# Patient Record
Sex: FEMALE | Race: BLACK OR AFRICAN AMERICAN | NOT HISPANIC OR LATINO | Employment: FULL TIME | URBAN - METROPOLITAN AREA
[De-identification: names, ages, dates, MRNs, and addresses within clinical notes are randomized per-mention and may not be internally consistent; named-entity substitution may affect disease eponyms.]

---

## 2017-11-02 ENCOUNTER — HOSPITAL ENCOUNTER (EMERGENCY)
Facility: CLINIC | Age: 42
Discharge: HOME | End: 2017-11-03
Attending: EMERGENCY MEDICINE | Admitting: EMERGENCY MEDICINE
Payer: COMMERCIAL

## 2017-11-02 ENCOUNTER — APPOINTMENT (OUTPATIENT)
Dept: GENERAL RADIOLOGY | Facility: CLINIC | Age: 42
End: 2017-11-02
Attending: EMERGENCY MEDICINE
Payer: COMMERCIAL

## 2017-11-02 DIAGNOSIS — R07.9 CHEST PAIN, UNSPECIFIED TYPE: ICD-10-CM

## 2017-11-02 DIAGNOSIS — M25.511 ACUTE PAIN OF RIGHT SHOULDER: ICD-10-CM

## 2017-11-02 DIAGNOSIS — K21.9 GASTROESOPHAGEAL REFLUX DISEASE, ESOPHAGITIS PRESENCE NOT SPECIFIED: ICD-10-CM

## 2017-11-02 LAB
ALBUMIN SERPL-MCNC: 3.1 G/DL (ref 3.4–5)
ALP SERPL-CCNC: 90 U/L (ref 40–150)
ALT SERPL W P-5'-P-CCNC: 25 U/L (ref 0–50)
ANION GAP SERPL CALCULATED.3IONS-SCNC: 5 MMOL/L (ref 3–14)
AST SERPL W P-5'-P-CCNC: 19 U/L (ref 0–45)
BASOPHILS # BLD AUTO: 0 10E9/L (ref 0–0.2)
BASOPHILS NFR BLD AUTO: 0.3 %
BILIRUB SERPL-MCNC: 0.2 MG/DL (ref 0.2–1.3)
BUN SERPL-MCNC: 13 MG/DL (ref 7–30)
CALCIUM SERPL-MCNC: 8.5 MG/DL (ref 8.5–10.1)
CHLORIDE SERPL-SCNC: 107 MMOL/L (ref 94–109)
CO2 SERPL-SCNC: 30 MMOL/L (ref 20–32)
CREAT SERPL-MCNC: 0.82 MG/DL (ref 0.52–1.04)
D DIMER PPP FEU-MCNC: <0.3 UG/ML FEU (ref 0–0.5)
DIFFERENTIAL METHOD BLD: ABNORMAL
EOSINOPHIL # BLD AUTO: 0.1 10E9/L (ref 0–0.7)
EOSINOPHIL NFR BLD AUTO: 0.5 %
ERYTHROCYTE [DISTWIDTH] IN BLOOD BY AUTOMATED COUNT: 13.9 % (ref 10–15)
GFR SERPL CREATININE-BSD FRML MDRD: 76 ML/MIN/1.7M2
GLUCOSE SERPL-MCNC: 101 MG/DL (ref 70–99)
HCT VFR BLD AUTO: 34.1 % (ref 35–47)
HGB BLD-MCNC: 11.1 G/DL (ref 11.7–15.7)
IMM GRANULOCYTES # BLD: 0 10E9/L (ref 0–0.4)
IMM GRANULOCYTES NFR BLD: 0.3 %
INTERPRETATION ECG - MUSE: NORMAL
LIPASE SERPL-CCNC: 79 U/L (ref 73–393)
LYMPHOCYTES # BLD AUTO: 2.8 10E9/L (ref 0.8–5.3)
LYMPHOCYTES NFR BLD AUTO: 29.3 %
MCH RBC QN AUTO: 26.3 PG (ref 26.5–33)
MCHC RBC AUTO-ENTMCNC: 32.6 G/DL (ref 31.5–36.5)
MCV RBC AUTO: 81 FL (ref 78–100)
MONOCYTES # BLD AUTO: 0.7 10E9/L (ref 0–1.3)
MONOCYTES NFR BLD AUTO: 7.8 %
NEUTROPHILS # BLD AUTO: 5.8 10E9/L (ref 1.6–8.3)
NEUTROPHILS NFR BLD AUTO: 61.8 %
NRBC # BLD AUTO: 0 10*3/UL
NRBC BLD AUTO-RTO: 0 /100
PLATELET # BLD AUTO: 299 10E9/L (ref 150–450)
POTASSIUM SERPL-SCNC: 3.8 MMOL/L (ref 3.4–5.3)
PROT SERPL-MCNC: 7 G/DL (ref 6.8–8.8)
RBC # BLD AUTO: 4.22 10E12/L (ref 3.8–5.2)
SODIUM SERPL-SCNC: 142 MMOL/L (ref 133–144)
TROPONIN I SERPL-MCNC: <0.015 UG/L (ref 0–0.04)
WBC # BLD AUTO: 9.4 10E9/L (ref 4–11)

## 2017-11-02 PROCEDURE — 96375 TX/PRO/DX INJ NEW DRUG ADDON: CPT

## 2017-11-02 PROCEDURE — 80053 COMPREHEN METABOLIC PANEL: CPT | Performed by: EMERGENCY MEDICINE

## 2017-11-02 PROCEDURE — 99285 EMERGENCY DEPT VISIT HI MDM: CPT | Mod: 25

## 2017-11-02 PROCEDURE — 96374 THER/PROPH/DIAG INJ IV PUSH: CPT

## 2017-11-02 PROCEDURE — 71020 XR CHEST 2 VW: CPT

## 2017-11-02 PROCEDURE — 25000132 ZZH RX MED GY IP 250 OP 250 PS 637: Performed by: EMERGENCY MEDICINE

## 2017-11-02 PROCEDURE — 25000125 ZZHC RX 250: Performed by: EMERGENCY MEDICINE

## 2017-11-02 PROCEDURE — 85379 FIBRIN DEGRADATION QUANT: CPT | Performed by: EMERGENCY MEDICINE

## 2017-11-02 PROCEDURE — 83690 ASSAY OF LIPASE: CPT | Performed by: EMERGENCY MEDICINE

## 2017-11-02 PROCEDURE — 93005 ELECTROCARDIOGRAM TRACING: CPT

## 2017-11-02 PROCEDURE — 85025 COMPLETE CBC W/AUTO DIFF WBC: CPT | Performed by: EMERGENCY MEDICINE

## 2017-11-02 PROCEDURE — 25000128 H RX IP 250 OP 636: Performed by: EMERGENCY MEDICINE

## 2017-11-02 PROCEDURE — 84484 ASSAY OF TROPONIN QUANT: CPT | Performed by: EMERGENCY MEDICINE

## 2017-11-02 PROCEDURE — S0028 INJECTION, FAMOTIDINE, 20 MG: HCPCS | Performed by: EMERGENCY MEDICINE

## 2017-11-02 RX ORDER — ONDANSETRON 2 MG/ML
4 INJECTION INTRAMUSCULAR; INTRAVENOUS ONCE
Status: COMPLETED | OUTPATIENT
Start: 2017-11-02 | End: 2017-11-02

## 2017-11-02 RX ORDER — MORPHINE SULFATE 2 MG/ML
2-4 INJECTION, SOLUTION INTRAMUSCULAR; INTRAVENOUS EVERY 10 MIN PRN
Status: DISCONTINUED | OUTPATIENT
Start: 2017-11-02 | End: 2017-11-03 | Stop reason: HOSPADM

## 2017-11-02 RX ORDER — ALUMINA, MAGNESIA, AND SIMETHICONE 2400; 2400; 240 MG/30ML; MG/30ML; MG/30ML
15 SUSPENSION ORAL ONCE
Status: COMPLETED | OUTPATIENT
Start: 2017-11-02 | End: 2017-11-02

## 2017-11-02 RX ADMIN — MORPHINE SULFATE 2 MG: 2 INJECTION, SOLUTION INTRAMUSCULAR; INTRAVENOUS at 22:27

## 2017-11-02 RX ADMIN — LIDOCAINE HYDROCHLORIDE 15 ML: 20 SOLUTION ORAL; TOPICAL at 22:25

## 2017-11-02 RX ADMIN — FAMOTIDINE 20 MG: 10 INJECTION, SOLUTION INTRAVENOUS at 22:24

## 2017-11-02 RX ADMIN — ALUMINUM HYDROXIDE, MAGNESIUM HYDROXIDE, AND DIMETHICONE 15 ML: 400; 400; 40 SUSPENSION ORAL at 22:25

## 2017-11-02 RX ADMIN — ONDANSETRON 4 MG: 2 SOLUTION INTRAMUSCULAR; INTRAVENOUS at 22:22

## 2017-11-02 NOTE — ED AVS SNAPSHOT
Emergency Department    [address] [place] [address]    Phone:  [phone]    Fax:  [phone]                                       Katarzyna Brito   MRN: [id number]    Department:   Emergency Department   Date of Visit:  11/2/2017           After Visit Summary Signature Page     I have received my discharge instructions, and my questions have been answered. I have discussed any challenges I see with this plan with the nurse or doctor.    ..........................................................................................................................................  Patient/Patient Representative Signature      ..........................................................................................................................................  Patient Representative Print Name and Relationship to Patient    ..................................................               ................................................  Date                                            Time    ..........................................................................................................................................  Reviewed by Signature/Title    ...................................................              ..............................................  Date                                                            Time

## 2017-11-02 NOTE — ED AVS SNAPSHOT
Emergency Department    [address] [facility] [address]    Phone:  [phone]    Fax:  [phone]                                       Katarzyna Brito   MRN: [id number]    Department:   Emergency Department   Date of Visit:  11/2/2017           Patient Information     Date Of Birth          1975        Your diagnoses for this visit were:     Chest pain, unspecified type     Gastroesophageal reflux disease, esophagitis presence not specified     Acute pain of right shoulder        You were seen by Mamadou Diana MD.      Follow-up Information     Follow up with Clinic, [facility].    Specialty:  Internal Medicine    Contact information:    [address] [facility] [address] [facility] [address] [phone]          Follow up with  Emergency Department.    Specialty:  EMERGENCY MEDICINE    Why:  If symptoms worsen    Contact information:    [address] [facility] [address] [phone]        Discharge Instructions           Discharge Instructions for Gastroesophageal Reflux Disease (GERD)  Gastroesophageal reflux disease (GERD) is a backflow of acid from the stomach into the swallowing tube (esophagus).  Home care  These home care steps can help you manage GERD:    Maintain a healthy weight. Get help to lose any extra pounds.    Avoid lying down after meals.    Avoid eating late at night.    Elevate the head of your bed by 6 inches. You can do this by placing wooden blocks or bed risers under the head of your bed.    Avoid wearing tight-fitting clothes.    Avoid foods that might irritate your stomach, such as the following:    Alcohol    Fat    Chocolate    Caffeine    Spearmint or peppermint    Talk to your healthcare provider if you are taking any of the following medicines. These medicines can make GERD symptoms worse:    Calcium channel blockers    Theophylline    Anticholinergic medicines, such as oxybutynin and benzatropine    Begin an exercise program.  Ask your healthcare provider how to get started. You can benefit from simple activities, such as walking or gardening.    Break the smoking habit. Enroll in a stop-smoking program to improve your chances of success.    Limit alcohol intake to no more than 2 drinks a day.    Take your medicines exactly as directed. Don t skip doses.    Avoid over-the-counter nonsteroidal anti-inflammatory medicines, such as aspirin and ibuprofen, unless recommended by your healthcare provider for certain conditions.     If possible, avoid nitrates (heart medicines, such as nitroglycerin and isosorbide dinitrate ).  Follow-up care  Make a follow-up appointment as directed by our staff.     When to call the healthcare provider  Call your healthcare provider immediately if you have any of the following:    Trouble swallowing    Pain when swallowing    Feeling of food caught in your chest or throat    Pain in the neck, chest, or back    Heartburn that causes you to vomit    Vomiting blood    Black or tarry stools (from digested blood)    More saliva (watering of the mouth) than usual    Weight loss of more than 3% to 5% of your total body weight in a month    Hoarseness or sore throat that won t go away    Choking, coughing, or wheezing   Date Last Reviewed: 7/1/2016 2000-2017 [employer]. [address] [place] [address]. All rights reserved. This information is not intended as a substitute for professional medical care. Always follow your healthcare professional's instructions.        *CHEST PAIN, NONCARDIAC    Based on your visit today, the exact cause of your chest pain is not certain. Your condition does not seem serious and your pain does not appear to be coming from your heart. However, sometimes the signs of a serious problem take more time to appear. Therefore, please watch for the warning signs listed below.  HOME CARE:  1. Rest today and avoid strenuous activity.  2. Take any prescribed medicine as  directed.  FOLLOW UP with your doctor in 1-3 days.   GET PROMPT MEDICAL ATTENTION if any of the following occur:    A change in the type of pain: if it feels different, becomes more severe, lasts longer, or begins to spread into your shoulder, arm, neck, jaw or back    Shortness of breath or increased pain with breathing    Cough with blood or dark colored sputum (phlegm)    Weakness, dizziness, or fainting    Fever over 101  F (38.3  C)    Swelling, pain or redness in one leg    1498-8970 [employer]. [address] [place] [address]. All rights reserved. This information is not intended as a substitute for professional medical care. Always follow your healthcare professional's instructions.  This information has been modified by your health care provider with permission from the publisher.      24 Hour Appointment Hotline       To make an appointment at any [facility], call [phone] ([phone]). If you don't have a family doctor or clinic, we will help you find one. [facility] clinics are conveniently located to serve the needs of you and your family.             Review of your medicines      START taking        Dose / Directions Last dose taken    omeprazole 20 MG CR capsule   Commonly known as:  priLOSEC   Dose:  20 mg   Quantity:  60 capsule        Take 1 capsule (20 mg) by mouth 2 times daily   Refills:  0        traMADol 50 MG tablet   Commonly known as:  ULTRAM   Dose:   mg   Quantity:  20 tablet        Take 1-2 tablets ( mg) by mouth every 6 hours as needed for pain Maximum 8 tablet(s) per day   Refills:  0          Our records show that you are taking the medicines listed below. If these are incorrect, please call your family doctor or clinic.        Dose / Directions Last dose taken    ADVIL 200 MG capsule   Dose:  400 mg   Generic drug:  ibuprofen        Take 400 mg by mouth every 4 hours as needed.   Refills:  0        antipyrine-benzocaine 54-14 MG/ML  Soln otic solution   Commonly known as:  AURODEX   Dose:  4 drop   Quantity:  15 mL        Place 4 drops Into the left ear every 2 hours as needed for moderate pain   Refills:  0        EXCEDRIN MIGRAINE PO        Take  by mouth.   Refills:  0        SUMAtriptan 50 MG tablet   Commonly known as:  IMITREX   Dose:  50 mg   Quantity:  18 tablet        Take 1 tablet by mouth at onset of headache for migraine. May repeat dose in 2 hours.  Do not exceed 200 mg in 24 hours   Refills:  3                Prescriptions were sent or printed at these locations (2 Prescriptions)                   Other Prescriptions                Printed at Department/Unit printer (2 of 2)         traMADol (ULTRAM) 50 MG tablet               omeprazole (PRILOSEC) 20 MG CR capsule                Procedures and tests performed during your visit     CBC with platelets differential    Chest XR,  PA & LAT    Comprehensive metabolic panel    D dimer quantitative    EKG 12-lead, tracing only    Lipase    Peripheral IV: Standard    Troponin I      Orders Needing Specimen Collection     None      Pending Results     Date and Time Order Name Status Description    11/2/2017 2246 Chest XR,  PA & LAT Preliminary             Pending Culture Results     No orders found for last 3 day(s).            Pending Results Instructions     If you had any lab results that were not finalized at the time of your Discharge, you can call the ED Lab Result RN at [phone]. You will be contacted by this team for any positive Lab results or changes in treatment. The nurses are available 7 days a week from 10A to 6:30P.  You can leave a message 24 hours per day and they will return your call.        Test Results From Your Hospital Stay        11/2/2017 10:18 PM      Component Results     Component Value Ref Range & Units Status    WBC 9.4 4.0 - 11.0 10e9/L Final    RBC Count 4.22 3.8 - 5.2 10e12/L Final    Hemoglobin 11.1 (L) 11.7 - 15.7 g/dL Final    Hematocrit 34.1 (L)  35.0 - 47.0 % Final    MCV 81 78 - 100 fl Final    MCH 26.3 (L) 26.5 - 33.0 pg Final    MCHC 32.6 31.5 - 36.5 g/dL Final    RDW 13.9 10.0 - 15.0 % Final    Platelet Count 299 150 - 450 10e9/L Final    Diff Method Automated Method  Final    % Neutrophils 61.8 % Final    % Lymphocytes 29.3 % Final    % Monocytes 7.8 % Final    % Eosinophils 0.5 % Final    % Basophils 0.3 % Final    % Immature Granulocytes 0.3 % Final    Nucleated RBCs 0 0 /100 Final    Absolute Neutrophil 5.8 1.6 - 8.3 10e9/L Final    Absolute Lymphocytes 2.8 0.8 - 5.3 10e9/L Final    Absolute Monocytes 0.7 0.0 - 1.3 10e9/L Final    Absolute Eosinophils 0.1 0.0 - 0.7 10e9/L Final    Absolute Basophils 0.0 0.0 - 0.2 10e9/L Final    Abs Immature Granulocytes 0.0 0 - 0.4 10e9/L Final    Absolute Nucleated RBC 0.0  Final         11/2/2017 10:37 PM      Component Results     Component Value Ref Range & Units Status    Troponin I ES <0.015 0.000 - 0.045 ug/L Final    The 99th percentile for upper reference range is 0.045 ug/L.  Troponin values   in the range of 0.045 - 0.120 ug/L may be associated with risks of adverse   clinical events.           11/2/2017 10:37 PM      Component Results     Component Value Ref Range & Units Status    Sodium 142 133 - 144 mmol/L Final    Potassium 3.8 3.4 - 5.3 mmol/L Final    Chloride 107 94 - 109 mmol/L Final    Carbon Dioxide 30 20 - 32 mmol/L Final    Anion Gap 5 3 - 14 mmol/L Final    Glucose 101 (H) 70 - 99 mg/dL Final    Urea Nitrogen 13 7 - 30 mg/dL Final    Creatinine 0.82 0.52 - 1.04 mg/dL Final    GFR Estimate 76 >60 mL/min/1.7m2 Final    Non  GFR Calc    GFR Estimate If Black >90 >60 mL/min/1.7m2 Final    African American GFR Calc    Calcium 8.5 8.5 - 10.1 mg/dL Final    Bilirubin Total 0.2 0.2 - 1.3 mg/dL Final    Albumin 3.1 (L) 3.4 - 5.0 g/dL Final    Protein Total 7.0 6.8 - 8.8 g/dL Final    Alkaline Phosphatase 90 40 - 150 U/L Final    ALT 25 0 - 50 U/L Final    AST 19 0 - 45 U/L Final          11/2/2017 10:34 PM      Component Results     Component Value Ref Range & Units Status    Lipase 79 73 - 393 U/L Final         11/2/2017 10:31 PM      Component Results     Component Value Ref Range & Units Status    D Dimer <0.3 0.0 - 0.50 ug/ml FEU Final    This D-dimer assay is intended for use in conjunction with a clinical pretest   probability assessment model to exclude pulmonary embolism (PE) and deep   venous thrombosis (DVT) in outpatients suspected of PE or DVT. The cut-off   value is 0.5 ug/mL FEU.           11/2/2017 11:51 PM      Narrative     XR CHEST 2 VW  11/2/2017 11:44 PM      HISTORY: Chest pain.    COMPARISON: 9/18/2011.    FINDINGS: The heart size is normal. The lungs are clear. No  pneumothorax or pleural effusion.        Impression     IMPRESSION: No acute abnormality.                Clinical Quality Measure: Blood Pressure Screening     Your blood pressure was checked while you were in the emergency department today. The last reading we obtained was  BP: 139/73 . Please read the guidelines below about what these numbers mean and what you should do about them.  If your systolic blood pressure (the top number) is less than 120 and your diastolic blood pressure (the bottom number) is less than 80, then your blood pressure is normal. There is nothing more that you need to do about it.  If your systolic blood pressure (the top number) is 120-139 or your diastolic blood pressure (the bottom number) is 80-89, your blood pressure may be higher than it should be. You should have your blood pressure rechecked within a year by a primary care provider.  If your systolic blood pressure (the top number) is 140 or greater or your diastolic blood pressure (the bottom number) is 90 or greater, you may have high blood pressure. High blood pressure is treatable, but if left untreated over time it can put you at risk for heart attack, stroke, or kidney failure. You should have your blood pressure rechecked  "by a primary care provider within the next 4 weeks.  If your provider in the emergency department today gave you specific instructions to follow-up with your doctor or provider even sooner than that, you should follow that instruction and not wait for up to 4 weeks for your follow-up visit.        Thank you for choosing [facility]       Thank you for choosing [facility] for your care. Our goal is always to provide you with excellent care. Hearing back from our patients is one way we can continue to improve our services. Please take a few minutes to complete the written survey that you may receive in the mail after you visit with us. Thank you!        [email or web address] Information     [email or web address] lets you send messages to your doctor, view your test results, renew your prescriptions, schedule appointments and more. To sign up, go to [email or web address] . Click on \"Log in\" on the left side of the screen, which will take you to the Welcome page. Then click on \"Sign up Now\" on the right side of the page.     You will be asked to enter the access code listed below, as well as some personal information. Please follow the directions to create your username and password.     Your access code is: [email or web address]  Expires: 2018 12:20 AM     Your access code will  in 90 days. If you need help or a new code, please call your [facility] clinic or [phone].        Care EveryWhere ID     This is your Care EveryWhere ID. This could be used by other organizations to access your [facility] medical records  [id number]        Equal Access to Services     SUSY LEBLANC : Hadii iggy Danielle, waaxda lueddie, qaybta kaalmaanibal li . So [facility] [phone].    ATENCIÓN: Si habla español, tiene a leonard disposición servicios gratuitos de asistencia lingüística. Llame al [phone].    We comply with applicable federal civil rights laws and [place] laws. We do not discriminate on the basis of " race, color, national origin, age, disability, sex, sexual orientation, or gender identity.            After Visit Summary       This is your record. Keep this with you and show to your community pharmacist(s) and doctor(s) at your next visit.

## 2017-11-03 VITALS
SYSTOLIC BLOOD PRESSURE: 138 MMHG | DIASTOLIC BLOOD PRESSURE: 76 MMHG | WEIGHT: 240 LBS | RESPIRATION RATE: 18 BRPM | HEIGHT: 64 IN | OXYGEN SATURATION: 98 % | TEMPERATURE: 98 F | BODY MASS INDEX: 40.97 KG/M2

## 2017-11-03 RX ORDER — TRAMADOL HYDROCHLORIDE 50 MG/1
50-100 TABLET ORAL EVERY 6 HOURS PRN
Qty: 20 TABLET | Refills: 0 | Status: SHIPPED | OUTPATIENT
Start: 2017-11-03 | End: 2018-12-19

## 2017-11-03 ASSESSMENT — ENCOUNTER SYMPTOMS
PALPITATIONS: 0
COUGH: 1
SORE THROAT: 0
SHORTNESS OF BREATH: 0
BACK PAIN: 1

## 2017-11-03 NOTE — ED NOTES
Pt c/o chest pain that started earlier today. Pain radiates into her back. Denies shortness of breath. Pt had right rotator cuff surgery on monday

## 2017-11-03 NOTE — DISCHARGE INSTRUCTIONS
Discharge Instructions for Gastroesophageal Reflux Disease (GERD)  Gastroesophageal reflux disease (GERD) is a backflow of acid from the stomach into the swallowing tube (esophagus).  Home care  These home care steps can help you manage GERD:    Maintain a healthy weight. Get help to lose any extra pounds.    Avoid lying down after meals.    Avoid eating late at night.    Elevate the head of your bed by 6 inches. You can do this by placing wooden blocks or bed risers under the head of your bed.    Avoid wearing tight-fitting clothes.    Avoid foods that might irritate your stomach, such as the following:    Alcohol    Fat    Chocolate    Caffeine    Spearmint or peppermint    Talk to your healthcare provider if you are taking any of the following medicines. These medicines can make GERD symptoms worse:    Calcium channel blockers    Theophylline    Anticholinergic medicines, such as oxybutynin and benzatropine    Begin an exercise program. Ask your healthcare provider how to get started. You can benefit from simple activities, such as walking or gardening.    Break the smoking habit. Enroll in a stop-smoking program to improve your chances of success.    Limit alcohol intake to no more than 2 drinks a day.    Take your medicines exactly as directed. Don t skip doses.    Avoid over-the-counter nonsteroidal anti-inflammatory medicines, such as aspirin and ibuprofen, unless recommended by your healthcare provider for certain conditions.     If possible, avoid nitrates (heart medicines, such as nitroglycerin and isosorbide dinitrate ).  Follow-up care  Make a follow-up appointment as directed by our staff.     When to call the healthcare provider  Call your healthcare provider immediately if you have any of the following:    Trouble swallowing    Pain when swallowing    Feeling of food caught in your chest or throat    Pain in the neck, chest, or back    Heartburn that causes you to vomit    Vomiting blood    Black  or tarry stools (from digested blood)    More saliva (watering of the mouth) than usual    Weight loss of more than 3% to 5% of your total body weight in a month    Hoarseness or sore throat that won t go away    Choking, coughing, or wheezing   Date Last Reviewed: 7/1/2016 2000-2017 [employer]. [address] [place] [address]. All rights reserved. This information is not intended as a substitute for professional medical care. Always follow your healthcare professional's instructions.        *CHEST PAIN, NONCARDIAC    Based on your visit today, the exact cause of your chest pain is not certain. Your condition does not seem serious and your pain does not appear to be coming from your heart. However, sometimes the signs of a serious problem take more time to appear. Therefore, please watch for the warning signs listed below.  HOME CARE:  1. Rest today and avoid strenuous activity.  2. Take any prescribed medicine as directed.  FOLLOW UP with your doctor in 1-3 days.   GET PROMPT MEDICAL ATTENTION if any of the following occur:    A change in the type of pain: if it feels different, becomes more severe, lasts longer, or begins to spread into your shoulder, arm, neck, jaw or back    Shortness of breath or increased pain with breathing    Cough with blood or dark colored sputum (phlegm)    Weakness, dizziness, or fainting    Fever over 101  F (38.3  C)    Swelling, pain or redness in one leg    5798-5505 [employer]. [address] [place] [address]. All rights reserved. This information is not intended as a substitute for professional medical care. Always follow your healthcare professional's instructions.  This information has been modified by your health care provider with permission from the publisher.

## 2017-11-03 NOTE — ED PROVIDER NOTES
History     Chief Complaint:  Chest Pain    HPI   Katarzyna Brito is a 42-year-old female who is 3 days status post right rotator cuff surgery and presents with a few hours chest pain. The patient reports that she was on oxycodone for pain after her surgery, but she believed this to be making her short of breath so she called her doctor and was told to discontinue this medication today. The patient states that her shortness of breath has gotten better but after she took her last dose of oxycodone this morning she started to develop chest and shoulder pain. She states that the pain came on quite suddenly and comments that it is mid-sternal and goes through to her back. The patient states that she has been coughing as well, but she denies nausea, vomiting, leg pain or swelling, and sore throat. The patient notes that her mother and other family members have had blood clots. She denies leg pain or swelling, she denies arm swelling. She reports she has been ambulatory since a few hours after her surgery.       Cardiac Risk Factors     Sex:     Female   Tobacco:    Former smoker   Hypertension:    Negative  Diabetes:    Negative  Hyperlipidemia:   Negative  Family History:   Negative       PE/DVT Risk Factors     Personal History:   Negative  Recent Travel:   Negative   Recent Surg/Hospital stay:  Positive   Tobacco:    Negative  Family History:   Positive   Hormone Use:   Negative   Cancer:    Negative  Trauma:    Negative       Allergies:  No known drug allergies.     Medications:    Aurodex  Excedrin migraine   Imitrex    Past Medical History:    Asthma   Gastroenteritis   Migraine    Past Surgical History:    C section   Tonsillectomy   Rotator cuff surgery, right--10/30/2017    Family History:    Alcohol/drug--Mother, Father     Social History:  Marital Status:   Presents to the ED alone   Tobacco Use: Former smoker   Alcohol Use: No   PCP: [facility]     Review of Systems   HENT: Negative  "for sore throat.    Respiratory: Positive for cough. Negative for shortness of breath.    Cardiovascular: Positive for chest pain. Negative for palpitations and leg swelling.   Musculoskeletal: Positive for back pain.        Positive for shoulder pain.    All other systems reviewed and are negative.    Physical Exam   Patient Vitals for the past 24 hrs:   BP Temp Temp src Heart Rate Resp SpO2 Height Weight   11/03/17 0028 138/76 - - 72 - 98 % - -   11/03/17 0000 139/73 - - - - 98 % - -   11/02/17 2330 143/80 - - 75 18 97 % - -   11/02/17 2315 - - - 71 18 97 % - -   11/02/17 2300 135/88 - - 89 17 98 % - -   11/02/17 2245 - - - 77 16 98 % - -   11/02/17 2230 144/77 - - 87 19 98 % - -   11/02/17 2200 132/84 - - 78 15 99 % - -   11/02/17 2146 118/64 98  F (36.7  C) Oral 89 20 98 % 1.626 m (5' 4\") 108.9 kg (240 lb)     Physical Exam  Constitutional:  Appears well-developed and well-nourished. Cooperative. looks anxious and uncomfortable  HENT:   Head:    Atraumatic.   Mouth/Throat:   Oropharynx is without erythema or exudate and mucous      membranes are moist.   Eyes:    Conjunctivae normal and EOM are normal.      Pupils are equal, round, and reactive to light.   Neck:    Normal range of motion. Neck supple.   Cardiovascular:  Normal rate, regular rhythm, normal heart sounds and radial and    dorsalis pedis pulses are 2+ and symmetric.    Pulmonary/Chest:  Effort normal and breath sounds normal.   Abdominal:   Soft. Bowel sounds are normal.      No splenomegaly or hepatomegaly. No tenderness. No rebound.   Musculoskeletal:  Normal range of motion. No edema and no tenderness. Clean dry dressings on left shoulder. No erythema, no bony tenderness.  Neurological:  Alert. Normal strength. No cranial nerve deficit. GCS 15  Skin:    Skin is warm and dry.   Psychiatric:   Normal mood and affect.       Emergency Department Course   ECG:  @ 2156  Indication: Chest pain   Vent. Rate 76 bpm. NY interval 134 ms. QRS duration 90 ms. " QT/QTc 392/441 ms. P-R-T axis 60 64 37.   Normal sinus rhythm. Nonspecific T wave abnormality. Abnormal ECG.  No significant change when compared to previous ECG from 9/18/2011   Read @ 2201 by Dr. Diana.     Imaging:  Chest XR, PA & LAT  No acute abnormality.     Radiographic findings were communicated with the patient who voiced understanding of the findings.    Laboratory:  Blood:  CMP: Albumin 3.1, otherwise WNL (Creatinine 0.82)  CBC:  WBC 9.4, HGB 11.1, , otherwise WNL   Troponin: <0.015 (WNL).   D Dimer quantitative: <0.3 (WNL)  Lipase: 79    Interventions:  (2222) Zofran, 4 mg, IV injection  (2224) Pepcid, 20 mg, IV   (2225) GI Cocktail, 30 mL, PO  (2227) Morphine, 2 mg, IV injection     Emergency Department Course:  Nursing notes and vitals reviewed.  (2156) EKG was done, interpretation as above.   (2206) I entered the room with my scribe, obtained the history, and performed an exam of the patient as documented above.   A peripheral IV was established. Blood was drawn from the patient. This was sent for laboratory testing, findings above.    The patient was sent for a chest x-ray while in the emergency department, findings above.    (0001) I went to update the patient on the results of the labs and imaging. The patient is ready for discharge.  Findings and plan explained to the patient. Patient discharged home with instructions regarding supportive care, medications, and reasons to return. The importance of close follow-up was reviewed.     Impression & Plan    Medical Decision Making:  Katarzyna Brito is a 42-year-old female who recently had a right shoulder rotator cuff repair. The patient presents to the ED for evaluation of chest and shoulder pain. She reported some shortness of breath earlier in the day that was resolved when she arrived here. Since the patient recently underwent surgery as well as her mentioning that she has a  family history of blood clots, a D dimer was ordered and returned  negative. ECG and troponin were ordered and showed no signs of ischemia and chest x-ray showed no acute abnormality. The remainder of the patient's labs were unremarkable. She felt better after having the GI cocktail while in the ED here, so I suspect that GERD is a factor in her presentation tonight. Additionally, she was told to discontinue her oxycodone for pain relief, thinking this was exacerbating her asthma, and took her last dose this morning, which was a half dose. I suspect that her shoulder pain is  post-operative pain. The patient was discharged with a prescription for tramadol for her shoulder pain and omeprazole for her GERD symptoms. Return precautions were discussed. Patient will follow up with primary care physician as needed.     Diagnosis:    ICD-10-CM   1. Chest pain, unspecified type R07.9   2. Gastroesophageal reflux disease, esophagitis presence not specified K21.9   3. Acute pain of right shoulder M25.511     Disposition:  discharged to home    Discharge Medications:  START taking these medications    Details   traMADol (ULTRAM) 50 MG tablet Take 1-2 tablets ( mg) by mouth every 6 hours as needed for pain Maximum 8 tablet(s) per day, Disp-20 tablet, R-0, Local Print      omeprazole (PRILOSEC) 20 MG CR capsule Take 1 capsule (20 mg) by mouth 2 times daily, Disp-60 capsule, R-0, Local Print     Scribe disclosure:  I, Benoit Payne, am serving as a scribe on 11/2/2017 at 10:06 PM to personally document services performed by Dr. Diana based on my observations and the provider's statements to me.     [facility] EMERGENCY DEPARTMENT       Mamadou Diana MD  11/03/17 0608

## 2017-12-26 ENCOUNTER — HOSPITAL ENCOUNTER (EMERGENCY)
Facility: CLINIC | Age: 42
Discharge: HOME | End: 2017-12-26
Attending: INTERNAL MEDICINE | Admitting: INTERNAL MEDICINE
Payer: COMMERCIAL

## 2017-12-26 VITALS
RESPIRATION RATE: 20 BRPM | HEART RATE: 105 BPM | TEMPERATURE: 99.4 F | DIASTOLIC BLOOD PRESSURE: 93 MMHG | OXYGEN SATURATION: 97 % | SYSTOLIC BLOOD PRESSURE: 138 MMHG

## 2017-12-26 DIAGNOSIS — J10.1 INFLUENZA A: ICD-10-CM

## 2017-12-26 LAB
FLUAV+FLUBV AG SPEC QL: NEGATIVE
FLUAV+FLUBV AG SPEC QL: POSITIVE
SPECIMEN SOURCE: ABNORMAL

## 2017-12-26 PROCEDURE — 99283 EMERGENCY DEPT VISIT LOW MDM: CPT

## 2017-12-26 PROCEDURE — 87804 INFLUENZA ASSAY W/OPTIC: CPT | Performed by: INTERNAL MEDICINE

## 2017-12-26 PROCEDURE — 25000132 ZZH RX MED GY IP 250 OP 250 PS 637: Performed by: INTERNAL MEDICINE

## 2017-12-26 RX ORDER — OSELTAMIVIR PHOSPHATE 75 MG/1
75 CAPSULE ORAL 2 TIMES DAILY
Qty: 10 CAPSULE | Refills: 0 | Status: SHIPPED | OUTPATIENT
Start: 2017-12-26 | End: 2017-12-31

## 2017-12-26 RX ORDER — IBUPROFEN 600 MG/1
600 TABLET, FILM COATED ORAL ONCE
Status: COMPLETED | OUTPATIENT
Start: 2017-12-26 | End: 2017-12-26

## 2017-12-26 RX ADMIN — IBUPROFEN 600 MG: 600 TABLET ORAL at 14:08

## 2017-12-26 NOTE — DISCHARGE INSTRUCTIONS
Discharge Instructions  Influenza    You were diagnosed today with influenza or influenza like illness.  Influenza is a respiratory illness caused by influenza A or B viruses.  Influenza causes fever, headache, muscle aches, and fatigue.  These symptoms start one to four days after you have been around a person with this illness.  People with influenza commonly have a dry cough, sore throat, and a runny nose.   Influenza is spread through sneezing and coughing and can live on surfaces for several days.  It is usually contagious for 5 days but in some cases up to 10 days and often affects several family members. If you have a family member who is less than 2 years old, older than 65 years old, pregnant or has a serious medical condition, they should be seen right away by a physician to decide if they should take preventative medications.      Return to the Emergency Department if:    You have trouble breathing.    You develop pain in your chest.    You have signs of being dehydrated, such as dizziness or unable to urinate at least three times daily.    You feel confused.    You cannot stop vomiting or you cannot drink enough fluids.    In children, you should seek help if the child has any of the above or if child:    Has blue or purplish skin color.    Is so irritable that he or she does not want to be held.    Does not have tears when crying (in infants) or does not urinate at least three times daily.    Does not wake up easily.    Follow-up with your doctor if you are not improving after 5 days.    What can I do to help myself?    Rest.    Fluids -- Drink hydrating solutions such as Gatorade  or Pedialyte  as often as you can. If you are drinking enough, you should pass urine at least every eight hours.    Tylenol  (acetaminophen) and Advil  (ibuprofen) can relieve fever, headache, and muscle aches. Do not give aspirin to children under 18 years old.     Antiviral treatment -- Antiviral medicines do not make the  flu symptoms go away immediately.  They have only been shown to make the symptoms go away 12 to 24 hours sooner than they would without treatment.       Antibiotics -- Antibiotics are NOT useful for treating viral illnesses such as influenza. Antibiotics should only be used if there is a bacterial complication of the flu such as bacterial pneumonia, ear infection, or sinusitis.    Because you were diagnosed with a flu like illness you are very contagious.  This means you cannot work, attend school or  for at least 24 hours or until you no longer have a fever.  If you were given a prescription for medicine here today, be sure to read all of the information (including the package insert) that comes with your prescription.  This will include important information about the medicine, its side effects, and any warnings that you need to know about.  The pharmacist who fills the prescription can provide more information and answer questions you may have about the medicine.  If you have questions or concerns that the pharmacist cannot address, please call or return to the Emergency Department.     Remember that you can always come back to the Emergency Department if you are not able to see your regular doctor in the amount of time listed above, if you get any new symptoms, or if there is anything that worries you.

## 2017-12-26 NOTE — ED AVS SNAPSHOT
[facility] Emergency Department    [address]    [facility] [address]    Phone:  [phone]    Fax:  [phone]                                       Katarzyna Brito   MRN: [id number]    Department:  [facility] Emergency Department   Date of Visit:  12/26/2017           Patient Information     Date Of Birth          1975        Your diagnoses for this visit were:     Influenza A        You were seen by Lisa Cannon MD.        Discharge Instructions       Discharge Instructions  Influenza    You were diagnosed today with influenza or influenza like illness.  Influenza is a respiratory illness caused by influenza A or B viruses.  Influenza causes fever, headache, muscle aches, and fatigue.  These symptoms start one to four days after you have been around a person with this illness.  People with influenza commonly have a dry cough, sore throat, and a runny nose.   Influenza is spread through sneezing and coughing and can live on surfaces for several days.  It is usually contagious for 5 days but in some cases up to 10 days and often affects several family members. If you have a family member who is less than 2 years old, older than 65 years old, pregnant or has a serious medical condition, they should be seen right away by a physician to decide if they should take preventative medications.      Return to the Emergency Department if:    You have trouble breathing.    You develop pain in your chest.    You have signs of being dehydrated, such as dizziness or unable to urinate at least three times daily.    You feel confused.    You cannot stop vomiting or you cannot drink enough fluids.    In children, you should seek help if the child has any of the above or if child:    Has blue or purplish skin color.    Is so irritable that he or she does not want to be held.    Does not have tears when crying (in infants) or does not urinate at least three times  daily.    Does not wake up easily.    Follow-up with your doctor if you are not improving after 5 days.    What can I do to help myself?    Rest.    Fluids -- Drink hydrating solutions such as Gatorade  or Pedialyte  as often as you can. If you are drinking enough, you should pass urine at least every eight hours.    Tylenol  (acetaminophen) and Advil  (ibuprofen) can relieve fever, headache, and muscle aches. Do not give aspirin to children under 18 years old.     Antiviral treatment -- Antiviral medicines do not make the flu symptoms go away immediately.  They have only been shown to make the symptoms go away 12 to 24 hours sooner than they would without treatment.       Antibiotics -- Antibiotics are NOT useful for treating viral illnesses such as influenza. Antibiotics should only be used if there is a bacterial complication of the flu such as bacterial pneumonia, ear infection, or sinusitis.    Because you were diagnosed with a flu like illness you are very contagious.  This means you cannot work, attend school or  for at least 24 hours or until you no longer have a fever.  If you were given a prescription for medicine here today, be sure to read all of the information (including the package insert) that comes with your prescription.  This will include important information about the medicine, its side effects, and any warnings that you need to know about.  The pharmacist who fills the prescription can provide more information and answer questions you may have about the medicine.  If you have questions or concerns that the pharmacist cannot address, please call or return to the Emergency Department.     Remember that you can always come back to the Emergency Department if you are not able to see your regular doctor in the amount of time listed above, if you get any new symptoms, or if there is anything that worries you.      24 Hour Appointment Hotline       To make an appointment at any [facility],  call [phone] ([phone]). If you don't have a family doctor or clinic, we will help you find one. [place] clinics are conveniently located to serve the needs of you and your family.             Review of your medicines      START taking        Dose / Directions Last dose taken    oseltamivir 75 MG capsule   Commonly known as:  TAMIFLU   Dose:  75 mg   Quantity:  10 capsule        Take 1 capsule (75 mg) by mouth 2 times daily for 5 days   Refills:  0          Our records show that you are taking the medicines listed below. If these are incorrect, please call your family doctor or clinic.        Dose / Directions Last dose taken    ADVIL 200 MG capsule   Dose:  400 mg   Generic drug:  ibuprofen        Take 400 mg by mouth every 4 hours as needed.   Refills:  0        antipyrine-benzocaine 54-14 MG/ML Soln otic solution   Commonly known as:  AURODEX   Dose:  4 drop   Quantity:  15 mL        Place 4 drops Into the left ear every 2 hours as needed for moderate pain   Refills:  0        EXCEDRIN MIGRAINE PO        Take  by mouth.   Refills:  0        SUMAtriptan 50 MG tablet   Commonly known as:  IMITREX   Dose:  50 mg   Quantity:  18 tablet        Take 1 tablet by mouth at onset of headache for migraine. May repeat dose in 2 hours.  Do not exceed 200 mg in 24 hours   Refills:  3        traMADol 50 MG tablet   Commonly known as:  ULTRAM   Dose:   mg   Quantity:  20 tablet        Take 1-2 tablets ( mg) by mouth every 6 hours as needed for pain Maximum 8 tablet(s) per day   Refills:  0                Prescriptions were sent or printed at these locations (1 Prescription)                   Other Prescriptions                Printed at Department/Unit printer (1 of 1)         oseltamivir (TAMIFLU) 75 MG capsule                Procedures and tests performed during your visit     Influenza A/B antigen      Orders Needing Specimen Collection     None      Pending Results     No orders found from  12/24/2017 to 12/27/2017.            Pending Culture Results     No orders found from 12/24/2017 to 12/27/2017.            Pending Results Instructions     If you had any lab results that were not finalized at the time of your Discharge, you can call the ED Lab Result RN at [phone]. You will be contacted by this team for any positive Lab results or changes in treatment. The nurses are available 7 days a week from 10A to 6:30P.  You can leave a message 24 hours per day and they will return your call.        Test Results From Your Hospital Stay        12/26/2017  2:42 PM      Component Results     Component Value Ref Range & Units Status    Influenza A/B Agn Specimen Nares  Final    Influenza A Positive (A) NEG^Negative Final    Influenza B Negative NEG^Negative Final    Test results must be correlated with clinical data. If necessary, results   should be confirmed by a molecular assay or viral culture.                  Clinical Quality Measure: Blood Pressure Screening     Your blood pressure was checked while you were in the emergency department today. The last reading we obtained was  BP: (!) 138/93 . Please read the guidelines below about what these numbers mean and what you should do about them.  If your systolic blood pressure (the top number) is less than 120 and your diastolic blood pressure (the bottom number) is less than 80, then your blood pressure is normal. There is nothing more that you need to do about it.  If your systolic blood pressure (the top number) is 120-139 or your diastolic blood pressure (the bottom number) is 80-89, your blood pressure may be higher than it should be. You should have your blood pressure rechecked within a year by a primary care provider.  If your systolic blood pressure (the top number) is 140 or greater or your diastolic blood pressure (the bottom number) is 90 or greater, you may have high blood pressure. High blood pressure is treatable, but if left untreated over  "time it can put you at risk for heart attack, stroke, or kidney failure. You should have your blood pressure rechecked by a primary care provider within the next 4 weeks.  If your provider in the emergency department today gave you specific instructions to follow-up with your doctor or provider even sooner than that, you should follow that instruction and not wait for up to 4 weeks for your follow-up visit.        Thank you for choosing [facility]       Thank you for choosing [facility] for your care. Our goal is always to provide you with excellent care. Hearing back from our patients is one way we can continue to improve our services. Please take a few minutes to complete the written survey that you may receive in the mail after you visit with us. Thank you!        [employer] Information     [employer] lets you send messages to your doctor, view your test results, renew your prescriptions, schedule appointments and more. To sign up, go to [email or web address] . Click on \"Log in\" on the left side of the screen, which will take you to the Welcome page. Then click on \"Sign up Now\" on the right side of the page.     You will be asked to enter the access code listed below, as well as some personal information. Please follow the directions to create your username and password.     Your access code is: [email or web address]  Expires: 2018 11:20 PM     Your access code will  in 90 days. If you need help or a new code, please call your [facility] clinic or [phone].        Care EveryWhere ID     This is your Care EveryWhere ID. This could be used by other organizations to access your [facility] medical records  [id number]        Equal Access to Services     [facility]: Hadii iggy Danielle, waaxda lueddie, qaybta ilianaalanibal lopez. So [facility] [phone].    ATENCIÓN: Si habla español, tiene a leonard disposición servicios gratuitos de asistencia lingüística. Llame al " [phone].    We comply with applicable federal civil rights laws and [place] laws. We do not discriminate on the basis of race, color, national origin, age, disability, sex, sexual orientation, or gender identity.            After Visit Summary       This is your record. Keep this with you and show to your community pharmacist(s) and doctor(s) at your next visit.

## 2017-12-26 NOTE — ED NOTES
ABC's intact.  Alert and oriented x4.    Pt c/o 3 day hx of generalized body aches with headache and nonproductive cough.  Fever to 103 at home this morning.  Breath sounds clear.    Tylenol taken at 0800  Dayquil taken at 1030

## 2017-12-26 NOTE — ED AVS SNAPSHOT
[facility] Emergency Department    [address]    [facility] [address]    Phone:  [phone]    Fax:  [phone]                                       Katarzyna Brito   MRN: [id number]    Department:  [facility] Emergency Department   Date of Visit:  12/26/2017           After Visit Summary Signature Page     I have received my discharge instructions, and my questions have been answered. I have discussed any challenges I see with this plan with the nurse or doctor.    ..........................................................................................................................................  Patient/Patient Representative Signature      ..........................................................................................................................................  Patient Representative Print Name and Relationship to Patient    ..................................................               ................................................  Date                                            Time    ..........................................................................................................................................  Reviewed by Signature/Title    ...................................................              ..............................................  Date                                                            Time

## 2017-12-26 NOTE — LETTER
December 26, 2017      To Whom It May Concern:      Katarzyna Brito was seen in our Emergency Department today, 12/26/17.  I expect her condition to improve over the next 4-6 days.  She may return to work/school when improved.    Sincerely,        Lisa Cannon MD

## 2017-12-27 NOTE — ED PROVIDER NOTES
CHIEF COMPLAINT:  Fever, myalgias.      HISTORY OF PRESENT ILLNESS:  Phuong Brito is a 42-year-old woman who has had preceding cold symptoms for a couple of days, but last night developed high fever and today developed diffuse myalgias.  She left work and came here.  She does have a history of asthma, but does not feel like her breathing is any worse than usual.  She has been taking a good amount of fluid and denies any nausea or vomiting.  She did get immunized against influenza this year.  She works in health care.      PAST MEDICAL HISTORY:  Asthma, migraine, obesity.      MEDICATIONS:  None.      ALLERGIES:  No known drug allergies.      FAMILY AND SOCIAL HISTORY:  Employment as noted.      REVIEW OF SYSTEMS:  All other systems are negative.      PHYSICAL EXAMINATION:   GENERAL:  Alert adult woman.   VITAL SIGNS:  Temperature 100.4, pulse 102, blood pressure 138/93, respirations 20, O2 sat 100%.   HEENT:  TMs normal, pharynx normal.   NECK:  Supple, no adenopathy.   RESPIRATORY:  Lungs are clear bilaterally.   CARDIOVASCULAR:  Regular rate and rhythm without murmur.   GASTROINTESTINAL:  Abdomen soft and nontender.       LABORATORY:  Rapid test positive for influenza A.      IMPRESSION:  Influenza.  Signs and symptoms consistent with viral influenza.  Since the fever only started last night and with history of asthma, I think she will benefit from Tamiflu, which was prescribed.  Influenza instructions, note for work, return if problems.         BERNICE KNOX MD             D: 2017 16:05   T: 2017 23:19   MT: [id number]      Name:     PHUONG BRITO   MRN:      [id number]        Account:      [id number]   :      1975           Visit Date:   2017      Document: [id number]

## 2018-12-19 ENCOUNTER — MEDICAL CORRESPONDENCE (OUTPATIENT)
Dept: HEALTH INFORMATION MANAGEMENT | Facility: CLINIC | Age: 43
End: 2018-12-19

## 2018-12-19 ENCOUNTER — OFFICE VISIT (OUTPATIENT)
Dept: FAMILY MEDICINE | Facility: CLINIC | Age: 43
End: 2018-12-19

## 2018-12-19 VITALS
BODY MASS INDEX: 42.88 KG/M2 | DIASTOLIC BLOOD PRESSURE: 78 MMHG | TEMPERATURE: 98.1 F | RESPIRATION RATE: 16 BRPM | OXYGEN SATURATION: 99 % | HEART RATE: 98 BPM | SYSTOLIC BLOOD PRESSURE: 122 MMHG | WEIGHT: 251.2 LBS | HEIGHT: 64 IN

## 2018-12-19 DIAGNOSIS — J45.21 INTERMITTENT ASTHMA WITH ACUTE EXACERBATION, UNSPECIFIED ASTHMA SEVERITY: Primary | ICD-10-CM

## 2018-12-19 DIAGNOSIS — R79.89 INCREASED PROLACTIN LEVEL: ICD-10-CM

## 2018-12-19 DIAGNOSIS — E66.01 MORBID OBESITY (H): ICD-10-CM

## 2018-12-19 DIAGNOSIS — Z71.89 ACP (ADVANCE CARE PLANNING): ICD-10-CM

## 2018-12-19 DIAGNOSIS — Z76.89 HEALTH CARE HOME: ICD-10-CM

## 2018-12-19 DIAGNOSIS — J06.9 UPPER RESPIRATORY TRACT INFECTION, UNSPECIFIED TYPE: ICD-10-CM

## 2018-12-19 PROCEDURE — 99203 OFFICE O/P NEW LOW 30 MIN: CPT | Performed by: FAMILY MEDICINE

## 2018-12-19 RX ORDER — AZITHROMYCIN 250 MG/1
TABLET, FILM COATED ORAL
Qty: 6 TABLET | Refills: 0 | Status: SHIPPED | OUTPATIENT
Start: 2018-12-19 | End: 2018-12-24

## 2018-12-19 RX ORDER — ALBUTEROL SULFATE 90 UG/1
2 AEROSOL, METERED RESPIRATORY (INHALATION) EVERY 6 HOURS PRN
Qty: 2 INHALER | Refills: 0 | Status: SHIPPED | OUTPATIENT
Start: 2018-12-19 | End: 2020-01-02

## 2018-12-19 RX ORDER — GUAIFENESIN 600 MG/1
1200 TABLET, EXTENDED RELEASE ORAL 2 TIMES DAILY
Qty: 40 TABLET | Refills: 0 | Status: SHIPPED | OUTPATIENT
Start: 2018-12-19 | End: 2018-12-29

## 2018-12-19 RX ORDER — PREDNISONE 5 MG/1
5 TABLET ORAL DAILY
Qty: 7 TABLET | Refills: 0 | Status: SHIPPED | OUTPATIENT
Start: 2018-12-19 | End: 2018-12-26

## 2018-12-19 RX ORDER — CABERGOLINE 0.5 MG/1
0.5 TABLET ORAL WEEKLY
COMMUNITY
Start: 2018-12-19 | End: 2021-06-15

## 2018-12-19 RX ORDER — ALBUTEROL SULFATE 0.83 MG/ML
2.5 SOLUTION RESPIRATORY (INHALATION) EVERY 4 HOURS PRN
Qty: 1 BOX | Refills: 0 | Status: ON HOLD | OUTPATIENT
Start: 2018-12-19 | End: 2021-05-16

## 2018-12-19 RX ORDER — CABERGOLINE 0.5 MG/1
TABLET ORAL
Refills: 3 | COMMUNITY
Start: 2018-02-16 | End: 2018-12-19

## 2018-12-19 RX ORDER — CALCIUM CARBONATE 500(1250)
500 TABLET ORAL
COMMUNITY
Start: 2018-04-25 | End: 2021-05-14

## 2018-12-19 RX ORDER — ALBUTEROL SULFATE 90 UG/1
2 AEROSOL, METERED RESPIRATORY (INHALATION) EVERY 6 HOURS
COMMUNITY
Start: 2018-12-19 | End: 2018-12-19

## 2018-12-19 SDOH — ECONOMIC STABILITY: FOOD INSECURITY: WITHIN THE PAST 12 MONTHS, YOU WORRIED THAT YOUR FOOD WOULD RUN OUT BEFORE YOU GOT MONEY TO BUY MORE.: NEVER TRUE

## 2018-12-19 SDOH — ECONOMIC STABILITY: TRANSPORTATION INSECURITY
IN THE PAST 12 MONTHS, HAS THE LACK OF TRANSPORTATION KEPT YOU FROM MEDICAL APPOINTMENTS OR FROM GETTING MEDICATIONS?: NO

## 2018-12-19 SDOH — ECONOMIC STABILITY: FOOD INSECURITY: WITHIN THE PAST 12 MONTHS, THE FOOD YOU BOUGHT JUST DIDN'T LAST AND YOU DIDN'T HAVE MONEY TO GET MORE.: NEVER TRUE

## 2018-12-19 SDOH — ECONOMIC STABILITY: TRANSPORTATION INSECURITY
IN THE PAST 12 MONTHS, HAS LACK OF TRANSPORTATION KEPT YOU FROM MEETINGS, WORK, OR FROM GETTING THINGS NEEDED FOR DAILY LIVING?: NO

## 2018-12-19 ASSESSMENT — MIFFLIN-ST. JEOR: SCORE: 1784.44

## 2018-12-19 NOTE — PATIENT INSTRUCTIONS
Follow asthma action plan    Symptomatic care with decongestants, fluids, tylenol/advil prn. Use GUAIFENESIN  MG OR TBCR, 1 tab po BID (Twice per day), D: 20, R: 0 for congestion and cough.    In addition, I have suggested that the patient   monitor for symptoms of bacterial infection expecting slow gradual resolution of viral URI as the natural course.    Recheck 3-7 days    Schedule endocrinology consultation

## 2018-12-19 NOTE — PROGRESS NOTES
SUBJECTIVE: 43 year old female complaining of nasal congestion with cough with chills and body aches for 1 week(s).   The patient describes improved most symptoms except thick nasal mucous down the back of her throat now chunky. Dry cough with nose clear.   The patient denies a history of fever, SOB or GI symptoms.   Smoking history: No.   Relevant past medical history: positive for asthma flaring with colds/ using albuterol five times daily.    OBJECTIVE: The patient appears healthy, alert, no distress, cooperative, smiling and over weight.   EARS: negative  NOSE/SINUS: positive findings: mucosa erythematous and swollen, clear rhinorrhea   THROAT: normal and post nasal drainage   NECK:Neck supple. No adenopathy. Thyroid symmetric, normal size,, Carotids without bruits.   CHEST: Clear with dry cough. No rales, rhonchi or wheezing    ASSESSMENT: (J45.21) Intermittent asthma with acute exacerbation, unspecified asthma severity  (primary encounter diagnosis)  Comment: follow AAP  Plan: albuterol (PROAIR HFA/PROVENTIL HFA/VENTOLIN         HFA) 108 (90 Base) MCG/ACT inhaler,         azithromycin (ZITHROMAX) 250 MG tablet,         predniSONE (DELTASONE) 5 MG tablet, guaiFENesin        (MUCINEX) 600 MG 12 hr tablet        Potential medication side effects were discussed with the patient; let me know if any occur.  Consider flu shot. Recheck 3-7 days    (J06.9) Upper respiratory tract infection, unspecified type  Plan: guaiFENesin (MUCINEX) 600 MG 12 hr tablet        Symptomatic care with decongestants, fluids, tylenol/advil prn. Use GUAIFENESIN  MG OR TBCR, 1 tab po BID (Twice per day), D: 20, R: 0 for congestion and cough.    In addition, I have suggested that the patient   monitor for symptoms of bacterial infection expecting slow gradual resolution of viral URI as the natural course.      (E66.01) Morbid obesity (H)  Plan: I have reviewed the patient's medical history in detail and updated the computerized patient  record.      (E22.9) Increased prolactin level (H)  Comment: off her medications  Plan: cabergoline (DOSTINEX) 0.5 MG tablet,         ENDOCRINOLOGY ADULT REFERRAL        Schedule Endocrinology consultation    (Z71.89) ACP (advance care planning)  Plan: I have reviewed the patient's medical history in detail and updated the computerized patient record.      (Z43.89) Health Care Home  Plan: I have reviewed the patient's medical history in detail and updated the computerized patient record.

## 2018-12-19 NOTE — LETTER
My Asthma Action Plan  Name: Katarzyna Brito   YOB: 1975  Date: 12/19/2018   My doctor: Maribel James MD   My clinic: [facility]      My Control Medicine: None  My Rescue Medicine: Albuterol (Proair/Ventolin/Proventil) inhaler prn  My Oral Steroid Medicine: prednisone My Asthma Severity: intermittent  My Best Peak Flow Number:NA  Avoid your asthma triggers: upper respiratory infections, dust mites and cold air               GREEN ZONE   Good Control    I feel good    No cough or wheeze    Can work, sleep and play without asthma symptoms    My Peak Flow number is above NA (>80% of Best)       Take your asthma control medicine every day.     1. If exercise triggers your asthma, take your rescue medication    15 minutes before exercise or sports, and    During exercise if you have asthma symptoms  2. Spacer to use with inhaler: If you have a spacer, make sure to use it with your inhaler             YELLOW ZONE Getting Worse  I have ANY of these:    I do not feel good    Cough or wheeze    Chest feels tight    Wake up at night    My Peak Flow number is between NA (50%) and Na (80%)   1. Keep taking your Green Zone medications  2. Start taking your rescue medicine:    every 20 minutes for up to 1 hour. Then every 4 hours for 24-48 hours.  3. If you stay in the Yellow Zone for more than 12-24 hours, contact your doctor.  4. If you do not return to the Green Zone in 12-24 hours or you get worse, start taking your oral steroid medicine if prescribed by your provider.           RED ZONE Medical Alert - Get Help  I have ANY of these:    I feel awful    Medicine is not helping    Breathing getting harder    Trouble walking or talking    Nose opens wide to breathe    My Peak Flow number is below Na (50%)       1. Take your rescue medicine NOW  2. If your provider has prescribed an oral steroid medicine, start taking it NOW  3. Call your doctor NOW  4. If you are still in the Red Zone  after 20 minutes and you have not reached your doctor:    Take your rescue medicine again and    Call 911 or go to the emergency room right away    See your regular doctor within 2 weeks of an Emergency Room or Urgent Care visit for follow-up treatment.          Annual Reminders:  Meet with Asthma Educator,  Flu Shot in the Fall, consider Pneumonia Vaccination for patients with asthma (aged 19 and older).    Pharmacy: [facility] [address] [place] [address]                      Asthma Triggers  How To Control Things That Make Your Asthma Worse    Triggers are things that make your asthma worse.  Look at the list below to help you find your triggers and what you can do about them.  You can help prevent asthma flare-ups by staying away from your triggers.      Trigger                                                          What you can do   Cigarette Smoke  Tobacco smoke can make asthma worse. Do not allow smoking in your home, car or around you.  Be sure no one smokes at a child s day care or school.  If you smoke, ask your health care provider for ways to help you quit.  Ask family members to quit too.  Ask your health care provider for a referral to Quit Plan to help you quit smoking, or call 3-228-590-PLAN.     Colds, Flu, Bronchitis  These are common triggers of asthma. Wash your hands often.  Don t touch your eyes, nose or mouth.  Get a flu shot every year.     Dust Mites  These are tiny bugs that live in cloth or carpet. They are too small to see. Wash sheets and blankets in hot water every week.   Encase pillows and mattress in dust mite proof covers.  Avoid having carpet if you can. If you have carpet, vacuum weekly.   Use a dust mask and HEPA vacuum.   Pollen and Outdoor Mold  Some people are allergic to trees, grass, or weed pollen, or molds. Try to keep your windows closed.  Limit time out doors when pollen count is high.   Ask you health care provider about taking medicine during  allergy season.     Animal Dander  Some people are allergic to skin flakes, urine or saliva from pets with fur or feathers. Keep pets with fur or feathers out of your home.    If you can t keep the pet outdoors, then keep the pet out of your bedroom.  Keep the bedroom door closed.  Keep pets off cloth furniture and away from stuffed toys.     Mice, Rats, and Cockroaches  Some people are allergic to the waste from these pests.   Cover food and garbage.  Clean up spills and food crumbs.  Store grease in the refrigerator.   Keep food out of the bedroom.   Indoor Mold  This can be a trigger if your home has high moisture. Fix leaking faucets, pipes, or other sources of water.   Clean moldy surfaces.  Dehumidify basement if it is damp and smelly.   Smoke, Strong Odors, and Sprays  These can reduce air quality. Stay away from strong odors and sprays, such as perfume, powder, hair spray, paints, smoke incense, paint, cleaning products, candles and new carpet.   Exercise or Sports  Some people with asthma have this trigger. Be active!  Ask your doctor about taking medicine before sports or exercise to prevent symptoms.    Warm up for 5-10 minutes before and after sports or exercise.     Other Triggers of Asthma  Cold air:  Cover your nose and mouth with a scarf.  Sometimes laughing or crying can be a trigger.  Some medicines and food can trigger asthma.

## 2018-12-19 NOTE — NURSING NOTE
Katarzyna is here today for a URI.    Pre-visit Screening:  Immunizations:  up to date  Colonoscopy:  NA  Mammogram: is up to date  Asthma Action Test/Plan:  Done today  PHQ9:  NA  GAD7:  NA  Questioned patient about current smoking habits Pt. quit smoking some time ago.  Ok to leave detailed message on voice mail for today's visit only Yes, phone # [phone]

## 2019-08-25 ENCOUNTER — HOSPITAL ENCOUNTER (EMERGENCY)
Facility: CLINIC | Age: 44
Discharge: HOME | End: 2019-08-25
Attending: PHYSICIAN ASSISTANT | Admitting: PHYSICIAN ASSISTANT

## 2019-08-25 VITALS
TEMPERATURE: 98.2 F | WEIGHT: 258.6 LBS | DIASTOLIC BLOOD PRESSURE: 93 MMHG | OXYGEN SATURATION: 100 % | SYSTOLIC BLOOD PRESSURE: 134 MMHG | BODY MASS INDEX: 44.39 KG/M2 | HEART RATE: 77 BPM | RESPIRATION RATE: 16 BRPM
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
## 2019-08-25 PROCEDURE — 99282 EMERGENCY DEPT VISIT SF MDM: CPT

## 2019-08-25 ASSESSMENT — ENCOUNTER SYMPTOMS
NUMBNESS: 0
FEVER: 0
CHILLS: 0
WEAKNESS: 0

## 2019-08-25 NOTE — DISCHARGE INSTRUCTIONS
Take Benadryl every 6 hours as needed for itching.  Consider calamine or oatmeal baths as well for itching.

## 2019-08-25 NOTE — ED AVS SNAPSHOT
[facility] Emergency Department  [address]  [facility] [address]  Phone:  [phone]  Fax:  [phone]                                    Katarzyna Brito   MRN: [id number]    Department:  [facility] Emergency Department   Date of Visit:  8/25/2019           After Visit Summary Signature Page    I have received my discharge instructions, and my questions have been answered. I have discussed any challenges I see with this plan with the nurse or doctor.    ..........................................................................................................................................  Patient/Patient Representative Signature      ..........................................................................................................................................  Patient Representative Print Name and Relationship to Patient    ..................................................               ................................................  Date                                   Time    ..........................................................................................................................................  Reviewed by Signature/Title    ...................................................              ..............................................  Date                                               Time          22EPIC Rev 08/18

## 2019-08-25 NOTE — ED PROVIDER NOTES
History     Chief Complaint:  Rash    HPI   Katarzyna Brito is a 44 year old female who presents accompanied by her  for evaluation of a rash. Approximately two days ago, the patient started to develop an itching rash over her bilateral ankles. She notes blister of one of the areas to her left ankle, prompting her evaluation. She used Hydrocortisone cream and Vaseline without improvement of her itching and she has taken Benadryl with temporary improvement. She has not had any known tick or other insect bites recently but she notes that she has been outside often, and she has not been around anyone with a similar rash. Due to her persistent rash today, the patient came into the ED for evaluation. She last took one tablet of Benadryl yesterday evening. She denies fevers, numbness, or tingling.     Allergies:  NKDA      Medications:    Albuterol inhaler  Albuterol nebulizer solution  Excedrin migraine  Dostinex  Calcium carbonate     Past Medical History:    Asthma   Migraine headaches  Obesity     Past Surgical History:     section  Rotator cuff repair, right  Tonsillectomy     Family History:    Alcohol/drug - Mother and father     Social History:  Tobacco use:    Former smoker   Alcohol use:    Negative   Drug use:    Negative   Marital status:       Accompanied to ED by:        Review of Systems   Constitutional: Negative for chills and fever.   Skin: Positive for rash (bilateral ankles).   Neurological: Negative for weakness and numbness.     Physical Exam   First Vitals:  BP: (!) 134/93  Pulse: 77  Temp: 98.2  F (36.8  C)  Resp: 16  Weight: 117.3 kg (258 lb 9.6 oz)  SpO2: 100 %    Physical Exam  General: Alert and oriented.  Patient appears more comfortable and is itching while entering the room.  Head:  The scalp, face, and head appear normal   Eyes:  Conjunctivae and sclerae are normal    CV:  DP and PT pulses intact to bilateral feet    Capillary refill is brisk in bilateral lower  extremities.  Resp:  No tachypnea.  No respiratory distress.  MS:  Patient is ambulatory.  Patient can wiggle toes bilaterally.  Patient can dorsi and plantarflex the bilateral ankles without difficulty.  Skin:  Well-demarcated, indurated, round, mildly raised areas to bilateral ankles.  This is consistent with bug bites.  No signs of cellulitis or secondary infection.  Neuro: Sensation intact throughout bilateral lower extremities.    Emergency Department Course     Emergency Department Course:  Nursing notes and vitals reviewed.  1810: I performed an exam of the patient as documented above.     Findings and plan explained to the Patient and spouse. Patient discharged home with instructions regarding supportive care, medications, and reasons to return. The importance of close follow-up was reviewed.     Impression & Plan      Medical Decision Making:  Katarzyna Brito is a 44 year old female who presents for evaluation of rash after probable bug bite.  Their rash and associated symptoms are most consistent with localized allergic phenomena.  This appears to be at this point an isolated rash without signs of angioedema, respiratory compromise, shock, serious systemic reaction, etc.  No signs of serious infection, cellulitis, vasculitis, or other skin manifestation of a serious underlying disease.  Supportive outpatient management is indicated.  Recommended use of Benadryl, and topical hydrocortisone/calamine.  Close followup with primary care physician in 2-3 days if no improvement. Would not treat with antibiotics at this point given time course and my suspicion that this is a cellulitis is low.  She will return immediately for any fevers, spreading rash, shortness of breath, wheezing or any other concerns.  All questions were answered prior to discharge.  Patient understands and agrees to this plan.    Diagnosis:    ICD-10-CM   1. Bug bite, initial encounter W57.XXXA     Disposition:  Discharged to home.       I  Collin Weathers, am serving as a scribe at 6:10 PM on 8/25/2019 to document services personally performed by Molly Skaggs PA-C, based on my observations and the provider's statements to me.    [facility] EMERGENCY DEPARTMENT       Molly Skaggs PA-C  08/25/19 2008

## 2019-12-31 ENCOUNTER — HOSPITAL ENCOUNTER (EMERGENCY)
Facility: CLINIC | Age: 44
Discharge: HOME | End: 2019-12-31
Attending: NURSE PRACTITIONER | Admitting: NURSE PRACTITIONER
Payer: COMMERCIAL

## 2019-12-31 VITALS
OXYGEN SATURATION: 99 % | BODY MASS INDEX: 40.97 KG/M2 | HEIGHT: 64 IN | TEMPERATURE: 97.8 F | DIASTOLIC BLOOD PRESSURE: 84 MMHG | SYSTOLIC BLOOD PRESSURE: 164 MMHG | WEIGHT: 240 LBS | RESPIRATION RATE: 16 BRPM

## 2019-12-31 DIAGNOSIS — R21 RASH: ICD-10-CM

## 2019-12-31 DIAGNOSIS — R23.8 BULLAE: ICD-10-CM

## 2019-12-31 PROCEDURE — 99282 EMERGENCY DEPT VISIT SF MDM: CPT

## 2019-12-31 ASSESSMENT — ENCOUNTER SYMPTOMS
FEVER: 0
COLOR CHANGE: 0
CHILLS: 0
WOUND: 1

## 2019-12-31 ASSESSMENT — MIFFLIN-ST. JEOR: SCORE: 1728.63

## 2019-12-31 NOTE — ED AVS SNAPSHOT
[facility] Emergency Department  [address]  [facility] [address]  Phone:  [phone]  Fax:  [phone]                                    Katarzyna Brito   MRN: [id number]    Department:  [facility] Emergency Department   Date of Visit:  12/31/2019           After Visit Summary Signature Page    I have received my discharge instructions, and my questions have been answered. I have discussed any challenges I see with this plan with the nurse or doctor.    ..........................................................................................................................................  Patient/Patient Representative Signature      ..........................................................................................................................................  Patient Representative Print Name and Relationship to Patient    ..................................................               ................................................  Date                                   Time    ..........................................................................................................................................  Reviewed by Signature/Title    ...................................................              ..............................................  Date                                               Time          22EPIC Rev 08/18

## 2019-12-31 NOTE — ED PROVIDER NOTES
"  History     Chief Complaint:  Wound Check    HPI   Katarzyna Brito is a 44 year old female who presents with a wound check. The patient recently noticed blisters around her left ankle, which seemed to improve after being soaked in Epsom salts. Upon waking this morning, she found an itchy bump on the back of her upper right arm. She rubbed it after work and felt some skin come off. The patient has no history of diabetes.     Allergies:  No Known Allergies     Medications:    Albuterol  cabergoline    Past Medical History:    Morbid obesity  Migraine  Asthma  Familial tremor  Hyperprolactinemia  Pituitary tumor    Past Surgical History:    C section x 2  Right rotator cuff repair  tonsillectomy    Family History:    Mother - alcohol/ drug  Father - alcohol/ drug    Social History:  Negative for current tobacco use - former smoker  Negative for alcohol use.   Marital Status:   [2]    Review of Systems   Constitutional: Negative for chills and fever.   Skin: Positive for wound. Negative for color change.   All other systems reviewed and are negative.      Physical Exam     Patient Vitals for the past 24 hrs:   BP Temp Temp src Heart Rate Resp SpO2 Height Weight   12/31/19 1705 (!) 164/84 97.8  F (36.6  C) Temporal 81 16 99 % 1.626 m (5' 4\") 108.9 kg (240 lb)        Physical Exam  General: Alert, No obvious discomfort, well kept   HENT:  Normal voice, No lymphadenopathy  Eyes:  The pupils are equal, round, and reactive to light, Conjunctiva normal, No scleral icterus   Neck:  Normal range of motion  CV:  Normal Pulses  Resp:  Non-labored, No cough  MS:  Normal muscular tone, moves all extremities  Skin:  2 small areas in her left ankle at approximately shoe line that appeared to be healing areas of friction type blister.  There is no significant erythema or induration.  No proximal streaking.  Triceps area of the right upper arm has a area that appears to be a bulla that has been the deroofed.  There is a small " amount of serous drainage.  There is no significant surrounding erythema or induration.  No fluctuance.  No proximal streaking.  Neuro: Speech is normal and fluent  Psych:  Awake. Alert.  Normal affect.  Appropriate interactions. Good eye contact    Emergency Department Course      Emergency Department Course:  Past medical records, nursing notes, and vitals reviewed.    1716 I performed an exam of the patient as documented above.     I personally reviewed the course of action with the Patient and answered all related questions prior to discharge.      Impression & Plan     Medical Decision Making:  Katarzyna Brito is a 44 year old female who presents to the emergency department today with itching blister on the posterior of her right upper arm in the triceps area.  She states that it started itching immensely and when she is scratched it it deroofed and had clear drainage.  Her examination is consistent with a ruptured bulla.  She has had chickenpox 2 times in the past and does have some endocrine issues given the amount of urticaria associated with this 1 area I would not be surprised if this is a reoccurrence of her chickenpox.  It does not appear to be shingles.  Again as this is very early in the disease we discussed that it may not declare itself for the next day or 2.  We discussed signs and symptoms of infection.  Again we discussed the natural course of chickenpox as well.  There is no indication for testing or imagery at this time.  Antibiotics are also not indicated.  She is advised on close follow-up in the next 2 to 3 days.  Strict return protocols were also discussed.  She does appear to be safe and appropriate for outpatient management follow-up and is discharged home.      Discharge Diagnosis:    ICD-10-CM    1. Bullae R23.8    2. Rash R21      Disposition:  Discharged to home     Scribe Disclosure:  Valarie BOLAND, am serving as a scribe at 5:19 PM on 12/31/2019 to document services personally  performed by Poncho Prieto APRN based on my observations and the provider's statements to me.       Poncho Prieto APRN CNP  12/31/19 [id number]

## 2019-12-31 NOTE — ED TRIAGE NOTES
Right posterior upper arm with a small itchy lesion .  Worsening redness and warmth throughout the day.  2 blisters also on left ankle area. Patient alert and oriented x3.  Airway, breathing and circulation intact.

## 2020-01-02 ENCOUNTER — APPOINTMENT (OUTPATIENT)
Dept: GENERAL RADIOLOGY | Facility: CLINIC | Age: 45
End: 2020-01-02
Attending: EMERGENCY MEDICINE
Payer: COMMERCIAL

## 2020-01-02 ENCOUNTER — HOSPITAL ENCOUNTER (EMERGENCY)
Facility: CLINIC | Age: 45
Discharge: HOME | End: 2020-01-02
Attending: EMERGENCY MEDICINE | Admitting: EMERGENCY MEDICINE
Payer: COMMERCIAL

## 2020-01-02 VITALS
SYSTOLIC BLOOD PRESSURE: 150 MMHG | OXYGEN SATURATION: 99 % | BODY MASS INDEX: 40.97 KG/M2 | DIASTOLIC BLOOD PRESSURE: 103 MMHG | TEMPERATURE: 97.7 F | HEIGHT: 64 IN | WEIGHT: 240 LBS | RESPIRATION RATE: 18 BRPM

## 2020-01-02 DIAGNOSIS — J40 BRONCHITIS: ICD-10-CM

## 2020-01-02 DIAGNOSIS — J45.21 INTERMITTENT ASTHMA WITH ACUTE EXACERBATION, UNSPECIFIED ASTHMA SEVERITY: ICD-10-CM

## 2020-01-02 PROCEDURE — 99283 EMERGENCY DEPT VISIT LOW MDM: CPT | Mod: 25

## 2020-01-02 PROCEDURE — 71046 X-RAY EXAM CHEST 2 VIEWS: CPT

## 2020-01-02 RX ORDER — IBUPROFEN 600 MG/1
600 TABLET, FILM COATED ORAL EVERY 6 HOURS PRN
Qty: 20 TABLET | Refills: 0 | Status: SHIPPED | OUTPATIENT
Start: 2020-01-02 | End: 2021-05-14

## 2020-01-02 RX ORDER — ALBUTEROL SULFATE 90 UG/1
2 AEROSOL, METERED RESPIRATORY (INHALATION) EVERY 6 HOURS PRN
Qty: 2 INHALER | Refills: 0 | Status: ON HOLD | OUTPATIENT
Start: 2020-01-02 | End: 2021-05-16

## 2020-01-02 ASSESSMENT — ENCOUNTER SYMPTOMS
FREQUENCY: 0
VOMITING: 0
ABDOMINAL PAIN: 0
FEVER: 0
VOICE CHANGE: 1
COUGH: 1
DYSURIA: 0
HEMATURIA: 0
NAUSEA: 0
SORE THROAT: 1

## 2020-01-02 ASSESSMENT — MIFFLIN-ST. JEOR: SCORE: 1728.63

## 2020-01-02 NOTE — LETTER
January 2, 2020      To Whom It May Concern:      Katarzyna Brito was seen in our Emergency Department today, 01/02/20.  I expect her condition to improve over the next 2-3 days.  She may return to work/school when improved.    Sincerely,        Kemi Kelley RN

## 2020-01-02 NOTE — ED PROVIDER NOTES
"  History     Chief Complaint:  Cough      HPI   Katarzyna Brito is a 44 year old female with a history of asthma,  who presents for evaluation of cough for the past few days. Patient describes that her symptoms began 1 week ago with a sore throat and then lost her voice. Following this, she developed her cough. She continues to endorse a sore throat, cough, and chest discomfort with coughing. She remarks that she has been taking Mucinex for her cough and notes that she has been coughing up yellow colored mucous. Denies fever, vomiting, abdominal pain, urinary symptoms, or any other complaints. No known ill contacts. No history of tobacco or drug use.     Allergies:  NKDA     Medications:    Albuterol  Dostinex  Excedrin    Past Medical History:    Asthma  Obesity  Migraine    Past Surgical History:    C section x2  Rotator cuff repair  Tonsillectomy     Family History:    Alcohol/drug    Social History:  Smoking status: former   Alcohol use: no  Drug use: no   PCP: Maribel James  Marital Status:        Review of Systems   Constitutional: Negative for fever.   HENT: Positive for sore throat and voice change.    Respiratory: Positive for cough.    Cardiovascular: Positive for chest pain (with coughing).   Gastrointestinal: Negative for abdominal pain, nausea and vomiting.   Genitourinary: Negative for dysuria, frequency, hematuria and urgency.   All other systems reviewed and are negative.      Physical Exam     Patient Vitals for the past 24 hrs:   BP Temp Temp src Heart Rate Resp SpO2 Height Weight   01/02/20 1236 (!) 150/103 97.7  F (36.5  C) Oral 88 18 99 % 1.626 m (5' 4\") 108.9 kg (240 lb)        Physical Exam  Nursing note and vitals reviewed.  Constitutional: Well nourished.   Eyes: Conjunctiva normal.  Pupils are equal, round, and reactive to light.   ENT: Nose normal. Mucous membranes pink and moist.  No posterior oropharygneal erythema/exudate. No peritonsillar abscess.   Neck: Normal range of " motion.  CVS: Normal rate, regular rhythm.  Normal heart sounds.  No murmur.  Pulmonary: Lungs clear to auscultation bilaterally. No wheezes/rales/rhonchi.  GI: Abdomen soft. Nontender, nondistended. No rigidity or guarding.    MSK: No calf tenderness or swelling.  Neuro: Alert. Follows simple commands.  Skin: Skin is warm and dry. No rash noted.   Psychiatric: Normal affect.       Emergency Department Course     Imaging:  Radiographic findings were communicated with the patient who voiced understanding of the findings.    Chest XR, PA & LAT  No acute cardiopulmonary disease.  As read by Radiology.    Emergency Department Course:  1408: Nursing notes and vitals reviewed.  I performed an exam of the patient as documented above.     The patient was sent for a chest xray while in the emergency department, findings above.     1420: I rechecked the patient and discussed the results of her workup thus far.     Findings and plan explained to the Patient. Patient discharged home with instructions regarding supportive care, medications, and reasons to return. The importance of close follow-up was reviewed. The patient was prescribed ibuprofen.     I personally answered all related questions prior to discharge.     Impression & Plan      Medical Decision Making:  The patient presented for cough.  She is nontoxic, no significant respiratory distress on arrival.  She reports recent URI symptoms.  No evidence to suggest pharyngitis, peritonsillar abscess, epiglottitis.  No evidence of pneumonia and chest xray fortunately negative. No clinical concern for cardiac cause of symptoms or PE.  Evaluation today suggests bronchitis which was discussed with the patient. Based on time course of illness, I suspect viral process and no indication for antibiotics at this time.  Plan for symptomatic management.  Recommended albuterol inhaler as needed as well as ibuprofen as I suspect some component of pleurisy.  Return for progressive SOB or  worsening fever curve.  D/C home. All questions answered.       Diagnosis:    ICD-10-CM    1. Bronchitis J40      Disposition:  discharged to home    Discharge Medications:  Discharge Medication List as of 1/2/2020  2:26 PM      START taking these medications    Details   ibuprofen (ADVIL/MOTRIN) 600 MG tablet Take 1 tablet (600 mg) by mouth every 6 hours as needed, Disp-20 tablet, R-0, Local Print           I, Karen Robertson, am serving as a scribe at 2:08 PM on 1/2/2020 to document services personally performed by Kennedi Hutchison DO based on my observations and the provider's statements to me.       Karen Robertson  1/2/2020   [facility] EMERGENCY DEPARTMENT       Kennedi Hutchison DO  01/02/20 1526

## 2020-01-02 NOTE — ED AVS SNAPSHOT
[facility] Emergency Department  [address]  [facility] [address]  Phone:  [phone]  Fax:  [phone]                                    Katarzyna Brito   MRN: [id number]    Department:  [facility] Emergency Department   Date of Visit:  1/2/2020           After Visit Summary Signature Page    I have received my discharge instructions, and my questions have been answered. I have discussed any challenges I see with this plan with the nurse or doctor.    ..........................................................................................................................................  Patient/Patient Representative Signature      ..........................................................................................................................................  Patient Representative Print Name and Relationship to Patient    ..................................................               ................................................  Date                                   Time    ..........................................................................................................................................  Reviewed by Signature/Title    ...................................................              ..............................................  Date                                               Time          22EPIC Rev 08/18

## 2021-05-14 ENCOUNTER — APPOINTMENT (OUTPATIENT)
Dept: GENERAL RADIOLOGY | Facility: CLINIC | Age: 46
End: 2021-05-14
Attending: EMERGENCY MEDICINE
Payer: COMMERCIAL

## 2021-05-14 ENCOUNTER — HOSPITAL ENCOUNTER (OUTPATIENT)
Facility: CLINIC | Age: 46
Setting detail: OBSERVATION
Discharge: HOME | End: 2021-05-16
Attending: EMERGENCY MEDICINE | Admitting: INTERNAL MEDICINE
Payer: COMMERCIAL

## 2021-05-14 DIAGNOSIS — R09.02 HYPOXIA: ICD-10-CM

## 2021-05-14 DIAGNOSIS — U07.1 CLINICAL DIAGNOSIS OF COVID-19: Primary | ICD-10-CM

## 2021-05-14 DIAGNOSIS — J12.82 PNEUMONIA DUE TO 2019 NOVEL CORONAVIRUS: ICD-10-CM

## 2021-05-14 DIAGNOSIS — J45.21 INTERMITTENT ASTHMA WITH ACUTE EXACERBATION, UNSPECIFIED ASTHMA SEVERITY: ICD-10-CM

## 2021-05-14 DIAGNOSIS — U07.1 PNEUMONIA DUE TO 2019 NOVEL CORONAVIRUS: ICD-10-CM

## 2021-05-14 LAB
ALBUMIN SERPL-MCNC: 3 G/DL (ref 3.4–5)
ALBUMIN UR-MCNC: 20 MG/DL
ALP SERPL-CCNC: 84 U/L (ref 40–150)
ALT SERPL W P-5'-P-CCNC: 30 U/L (ref 0–50)
ANION GAP SERPL CALCULATED.3IONS-SCNC: 3 MMOL/L (ref 3–14)
APPEARANCE UR: CLEAR
AST SERPL W P-5'-P-CCNC: 32 U/L (ref 0–45)
B-HCG FREE SERPL-ACNC: <5 IU/L
BASOPHILS # BLD AUTO: 0 10E9/L (ref 0–0.2)
BASOPHILS NFR BLD AUTO: 0.3 %
BILIRUB DIRECT SERPL-MCNC: <0.1 MG/DL (ref 0–0.2)
BILIRUB SERPL-MCNC: 0.5 MG/DL (ref 0.2–1.3)
BILIRUB UR QL STRIP: NEGATIVE
BUN SERPL-MCNC: 6 MG/DL (ref 7–30)
CALCIUM SERPL-MCNC: 8.2 MG/DL (ref 8.5–10.1)
CHLORIDE SERPL-SCNC: 107 MMOL/L (ref 94–109)
CO2 SERPL-SCNC: 28 MMOL/L (ref 20–32)
COLOR UR AUTO: YELLOW
CREAT SERPL-MCNC: 1.08 MG/DL (ref 0.52–1.04)
CRP SERPL-MCNC: 38.5 MG/L (ref 0–8)
D DIMER PPP FEU-MCNC: 0.5 UG/ML FEU (ref 0–0.5)
DIFFERENTIAL METHOD BLD: ABNORMAL
EOSINOPHIL # BLD AUTO: 0 10E9/L (ref 0–0.7)
EOSINOPHIL NFR BLD AUTO: 0 %
ERYTHROCYTE [DISTWIDTH] IN BLOOD BY AUTOMATED COUNT: 13.4 % (ref 10–15)
GFR SERPL CREATININE-BSD FRML MDRD: 62 ML/MIN/{1.73_M2}
GLUCOSE SERPL-MCNC: 106 MG/DL (ref 70–99)
GLUCOSE UR STRIP-MCNC: NEGATIVE MG/DL
HCT VFR BLD AUTO: 39.6 % (ref 35–47)
HGB BLD-MCNC: 12.6 G/DL (ref 11.7–15.7)
HGB UR QL STRIP: NEGATIVE
IMM GRANULOCYTES # BLD: 0 10E9/L (ref 0–0.4)
IMM GRANULOCYTES NFR BLD: 0.3 %
INTERPRETATION ECG - MUSE: NORMAL
KETONES UR STRIP-MCNC: NEGATIVE MG/DL
LEUKOCYTE ESTERASE UR QL STRIP: NEGATIVE
LYMPHOCYTES # BLD AUTO: 1.8 10E9/L (ref 0.8–5.3)
LYMPHOCYTES NFR BLD AUTO: 45.6 %
MAGNESIUM SERPL-MCNC: 2.3 MG/DL (ref 1.6–2.3)
MCH RBC QN AUTO: 26.3 PG (ref 26.5–33)
MCHC RBC AUTO-ENTMCNC: 31.8 G/DL (ref 31.5–36.5)
MCV RBC AUTO: 83 FL (ref 78–100)
MONOCYTES # BLD AUTO: 0.4 10E9/L (ref 0–1.3)
MONOCYTES NFR BLD AUTO: 9.1 %
MUCOUS THREADS #/AREA URNS LPF: PRESENT /LPF
NEUTROPHILS # BLD AUTO: 1.8 10E9/L (ref 1.6–8.3)
NEUTROPHILS NFR BLD AUTO: 44.7 %
NITRATE UR QL: NEGATIVE
NRBC # BLD AUTO: 0 10*3/UL
NRBC BLD AUTO-RTO: 0 /100
PH UR STRIP: 7.5 PH (ref 5–7)
PHOSPHATE SERPL-MCNC: 3.1 MG/DL (ref 2.5–4.5)
PLATELET # BLD AUTO: 207 10E9/L (ref 150–450)
POTASSIUM SERPL-SCNC: 4 MMOL/L (ref 3.4–5.3)
PROT SERPL-MCNC: 7.6 G/DL (ref 6.8–8.8)
RBC # BLD AUTO: 4.8 10E12/L (ref 3.8–5.2)
RBC #/AREA URNS AUTO: 1 /HPF (ref 0–2)
SODIUM SERPL-SCNC: 138 MMOL/L (ref 133–144)
SOURCE: ABNORMAL
SP GR UR STRIP: 1.02 (ref 1–1.03)
SQUAMOUS #/AREA URNS AUTO: 3 /HPF (ref 0–1)
TROPONIN I SERPL-MCNC: <0.015 UG/L (ref 0–0.04)
UROBILINOGEN UR STRIP-MCNC: 2 MG/DL (ref 0–2)
WBC # BLD AUTO: 4 10E9/L (ref 4–11)
WBC #/AREA URNS AUTO: 3 /HPF (ref 0–5)

## 2021-05-14 PROCEDURE — 96376 TX/PRO/DX INJ SAME DRUG ADON: CPT

## 2021-05-14 PROCEDURE — 71045 X-RAY EXAM CHEST 1 VIEW: CPT

## 2021-05-14 PROCEDURE — 85025 COMPLETE CBC W/AUTO DIFF WBC: CPT | Performed by: EMERGENCY MEDICINE

## 2021-05-14 PROCEDURE — 96361 HYDRATE IV INFUSION ADD-ON: CPT

## 2021-05-14 PROCEDURE — 250N000011 HC RX IP 250 OP 636: Performed by: EMERGENCY MEDICINE

## 2021-05-14 PROCEDURE — 999N000156 HC STATISTIC RCP CONSULT EA 30 MIN

## 2021-05-14 PROCEDURE — 96372 THER/PROPH/DIAG INJ SC/IM: CPT | Mod: 59 | Performed by: INTERNAL MEDICINE

## 2021-05-14 PROCEDURE — 80076 HEPATIC FUNCTION PANEL: CPT | Performed by: EMERGENCY MEDICINE

## 2021-05-14 PROCEDURE — G0378 HOSPITAL OBSERVATION PER HR: HCPCS

## 2021-05-14 PROCEDURE — 86140 C-REACTIVE PROTEIN: CPT | Performed by: EMERGENCY MEDICINE

## 2021-05-14 PROCEDURE — 250N000009 HC RX 250: Performed by: INTERNAL MEDICINE

## 2021-05-14 PROCEDURE — 250N000012 HC RX MED GY IP 250 OP 636 PS 637: Performed by: INTERNAL MEDICINE

## 2021-05-14 PROCEDURE — 93005 ELECTROCARDIOGRAM TRACING: CPT

## 2021-05-14 PROCEDURE — 85379 FIBRIN DEGRADATION QUANT: CPT | Performed by: EMERGENCY MEDICINE

## 2021-05-14 PROCEDURE — 83735 ASSAY OF MAGNESIUM: CPT | Performed by: EMERGENCY MEDICINE

## 2021-05-14 PROCEDURE — 258N000003 HC RX IP 258 OP 636: Performed by: INTERNAL MEDICINE

## 2021-05-14 PROCEDURE — 250N000011 HC RX IP 250 OP 636: Performed by: INTERNAL MEDICINE

## 2021-05-14 PROCEDURE — 99220 PR INITIAL OBSERVATION CARE,LEVEL III: CPT | Performed by: INTERNAL MEDICINE

## 2021-05-14 PROCEDURE — 84484 ASSAY OF TROPONIN QUANT: CPT | Performed by: EMERGENCY MEDICINE

## 2021-05-14 PROCEDURE — 84702 CHORIONIC GONADOTROPIN TEST: CPT

## 2021-05-14 PROCEDURE — 84100 ASSAY OF PHOSPHORUS: CPT | Performed by: EMERGENCY MEDICINE

## 2021-05-14 PROCEDURE — 258N000003 HC RX IP 258 OP 636: Performed by: EMERGENCY MEDICINE

## 2021-05-14 PROCEDURE — 80048 BASIC METABOLIC PNL TOTAL CA: CPT | Performed by: EMERGENCY MEDICINE

## 2021-05-14 PROCEDURE — 96375 TX/PRO/DX INJ NEW DRUG ADDON: CPT

## 2021-05-14 PROCEDURE — 81001 URINALYSIS AUTO W/SCOPE: CPT | Performed by: EMERGENCY MEDICINE

## 2021-05-14 PROCEDURE — 99285 EMERGENCY DEPT VISIT HI MDM: CPT | Mod: 25

## 2021-05-14 PROCEDURE — 96374 THER/PROPH/DIAG INJ IV PUSH: CPT

## 2021-05-14 RX ORDER — SODIUM CHLORIDE 9 MG/ML
INJECTION, SOLUTION INTRAVENOUS CONTINUOUS
Status: DISCONTINUED | OUTPATIENT
Start: 2021-05-14 | End: 2021-05-14

## 2021-05-14 RX ORDER — PROCHLORPERAZINE 25 MG
25 SUPPOSITORY, RECTAL RECTAL EVERY 12 HOURS PRN
Status: DISCONTINUED | OUTPATIENT
Start: 2021-05-14 | End: 2021-05-16 | Stop reason: HOSPADM

## 2021-05-14 RX ORDER — CALCIUM CARBONATE 500 MG/1
1000 TABLET, CHEWABLE ORAL 4 TIMES DAILY PRN
Status: DISCONTINUED | OUTPATIENT
Start: 2021-05-14 | End: 2021-05-16 | Stop reason: HOSPADM

## 2021-05-14 RX ORDER — AMOXICILLIN 250 MG
1 CAPSULE ORAL 2 TIMES DAILY PRN
Status: DISCONTINUED | OUTPATIENT
Start: 2021-05-14 | End: 2021-05-16 | Stop reason: HOSPADM

## 2021-05-14 RX ORDER — LANOLIN ALCOHOL/MO/W.PET/CERES
3 CREAM (GRAM) TOPICAL
Status: DISCONTINUED | OUTPATIENT
Start: 2021-05-14 | End: 2021-05-16 | Stop reason: HOSPADM

## 2021-05-14 RX ORDER — ONDANSETRON 4 MG/1
4 TABLET, ORALLY DISINTEGRATING ORAL EVERY 6 HOURS PRN
Status: DISCONTINUED | OUTPATIENT
Start: 2021-05-14 | End: 2021-05-16 | Stop reason: HOSPADM

## 2021-05-14 RX ORDER — BISACODYL 10 MG
10 SUPPOSITORY, RECTAL RECTAL DAILY PRN
Status: DISCONTINUED | OUTPATIENT
Start: 2021-05-14 | End: 2021-05-16 | Stop reason: HOSPADM

## 2021-05-14 RX ORDER — ACETAMINOPHEN 325 MG/1
650 TABLET ORAL EVERY 4 HOURS PRN
Status: DISCONTINUED | OUTPATIENT
Start: 2021-05-14 | End: 2021-05-16 | Stop reason: HOSPADM

## 2021-05-14 RX ORDER — ACETAMINOPHEN 650 MG/1
650 SUPPOSITORY RECTAL EVERY 4 HOURS PRN
Status: DISCONTINUED | OUTPATIENT
Start: 2021-05-14 | End: 2021-05-16 | Stop reason: HOSPADM

## 2021-05-14 RX ORDER — PROCHLORPERAZINE MALEATE 5 MG
10 TABLET ORAL EVERY 6 HOURS PRN
Status: DISCONTINUED | OUTPATIENT
Start: 2021-05-14 | End: 2021-05-16 | Stop reason: HOSPADM

## 2021-05-14 RX ORDER — POLYETHYLENE GLYCOL 3350 17 G/17G
17 POWDER, FOR SOLUTION ORAL DAILY PRN
Status: DISCONTINUED | OUTPATIENT
Start: 2021-05-14 | End: 2021-05-16 | Stop reason: HOSPADM

## 2021-05-14 RX ORDER — ALBUTEROL SULFATE 90 UG/1
2 AEROSOL, METERED RESPIRATORY (INHALATION) EVERY 4 HOURS PRN
Status: DISCONTINUED | OUTPATIENT
Start: 2021-05-14 | End: 2021-05-16 | Stop reason: HOSPADM

## 2021-05-14 RX ORDER — AMOXICILLIN 250 MG
2 CAPSULE ORAL 2 TIMES DAILY PRN
Status: DISCONTINUED | OUTPATIENT
Start: 2021-05-14 | End: 2021-05-16 | Stop reason: HOSPADM

## 2021-05-14 RX ORDER — ONDANSETRON 2 MG/ML
4 INJECTION INTRAMUSCULAR; INTRAVENOUS EVERY 6 HOURS PRN
Status: DISCONTINUED | OUTPATIENT
Start: 2021-05-14 | End: 2021-05-16 | Stop reason: HOSPADM

## 2021-05-14 RX ORDER — ALBUTEROL SULFATE 90 UG/1
2 AEROSOL, METERED RESPIRATORY (INHALATION)
Status: DISCONTINUED | OUTPATIENT
Start: 2021-05-14 | End: 2021-05-16 | Stop reason: HOSPADM

## 2021-05-14 RX ORDER — LIDOCAINE 40 MG/G
CREAM TOPICAL
Status: DISCONTINUED | OUTPATIENT
Start: 2021-05-14 | End: 2021-05-16 | Stop reason: HOSPADM

## 2021-05-14 RX ORDER — DEXAMETHASONE SODIUM PHOSPHATE 10 MG/ML
6 INJECTION, SOLUTION INTRAMUSCULAR; INTRAVENOUS ONCE
Status: COMPLETED | OUTPATIENT
Start: 2021-05-14 | End: 2021-05-14

## 2021-05-14 RX ORDER — KETOROLAC TROMETHAMINE 15 MG/ML
15 INJECTION, SOLUTION INTRAMUSCULAR; INTRAVENOUS ONCE
Status: COMPLETED | OUTPATIENT
Start: 2021-05-14 | End: 2021-05-14

## 2021-05-14 RX ADMIN — KETOROLAC TROMETHAMINE 15 MG: 15 INJECTION, SOLUTION INTRAMUSCULAR; INTRAVENOUS at 04:18

## 2021-05-14 RX ADMIN — DEXAMETHASONE 6 MG: 2 TABLET ORAL at 13:51

## 2021-05-14 RX ADMIN — DEXAMETHASONE SODIUM PHOSPHATE 6 MG: 10 INJECTION, SOLUTION INTRAMUSCULAR; INTRAVENOUS at 04:59

## 2021-05-14 RX ADMIN — REMDESIVIR 200 MG: 100 INJECTION, POWDER, LYOPHILIZED, FOR SOLUTION INTRAVENOUS at 13:44

## 2021-05-14 RX ADMIN — ENOXAPARIN SODIUM 40 MG: 40 INJECTION SUBCUTANEOUS at 13:52

## 2021-05-14 RX ADMIN — SODIUM CHLORIDE 50 ML: 9 INJECTION, SOLUTION INTRAVENOUS at 13:48

## 2021-05-14 RX ADMIN — SODIUM CHLORIDE 1000 ML: 9 INJECTION, SOLUTION INTRAVENOUS at 04:18

## 2021-05-14 ASSESSMENT — ENCOUNTER SYMPTOMS
DIARRHEA: 1
COUGH: 1
APPETITE CHANGE: 1
CHILLS: 1
FREQUENCY: 0
ABDOMINAL PAIN: 1
SHORTNESS OF BREATH: 1
VOMITING: 0
DYSURIA: 1
WHEEZING: 0
BLOOD IN STOOL: 0
NAUSEA: 1
MYALGIAS: 1
FEVER: 1
HEMATURIA: 0

## 2021-05-14 ASSESSMENT — MIFFLIN-ST. JEOR: SCORE: 1746.77

## 2021-05-14 NOTE — ED PROVIDER NOTES
History   Chief Complaint  Covid Concern    LEXI  Katarzyna Brito is a 45 year old female with a history of seasonal asthma who presents for evaluation of chest pain and shortness of breath in the setting of Covid positive test on Wednesday.  She notes her symptoms started 1 week ago.  They include body aches, fevers and chills, poor appetite, cough productive of clear sputum sometimes streaked with blood, chest pain has become more persistent today and is substernal, upper abdominal discomfort associate with nausea, and diarrhea that is not black or bloody.  She denies vomiting.  She notes the shortness of breath is worse with exertion.  She denies any new leg swelling or asymmetric pain.  She had wheezing yesterday but otherwise denies any wheezing currently.  She notes she has had minimal to eat and has been drinking less than normal.  She notes burning with urination but no hematuria or urinary frequency.    Review of Systems   Constitutional: Positive for appetite change, chills and fever.   Respiratory: Positive for cough and shortness of breath. Negative for wheezing (resolved).    Cardiovascular: Positive for chest pain. Negative for leg swelling.   Gastrointestinal: Positive for abdominal pain, diarrhea and nausea. Negative for blood in stool and vomiting.   Genitourinary: Positive for dysuria. Negative for decreased urine volume, frequency, hematuria and urgency.   Musculoskeletal: Positive for myalgias.   All other systems reviewed and are negative.    Allergies  The patient has no known allergies.     Medications  Albuterol  Excedrin  Dostinex    Past Medical History  Asthma  Migraine  Obesity  Gastroenteritis    Past Surgical History   x2  Rotator cuff repair  Tonsillectomy    Family History  Alcohol/drug use    Social History  Presents to the ED alone.   Nonsmoker    Physical Exam     Patient Vitals for the past 24 hrs:   BP Temp Temp src Pulse Resp SpO2   21 0430 (!) 127/97 -- -- 105 24  94 %   05/14/21 0400 109/72 -- -- 120 25 --   05/14/21 0355 -- -- -- 111 27 95 %   05/14/21 0345 128/86 99.6  F (37.6  C) Oral 116 24 93 %     Physical Exam  General: Adult female sitting upright  Eyes: PERRL, Conjunctive within normal limits  ENT: Moist mucous membranes, oropharynx clear.   CV: Normal S1S2, no murmur, rub or gallop.  Tachycardic, regular.  Resp: Clear to auscultation bilaterally, no wheezes, rales or rhonchi. Normal respiratory effort.  Deep breathing prompts cough feeding spells.  GI: Abdomen is soft and  nondistended.  Mild epigastric tenderness palpation.  No palpable masses. No rebound or guarding.  MSK: No edema. Nontender. Normal active range of motion.  Skin: Warm and dry. No rashes or lesions or ecchymoses on visible skin.  Neuro: Alert and oriented. Responds appropriately to all questions and commands. No focal findings appreciated. Normal muscle tone.  Psych: Normal mood and affect.    Emergency Department Course   ECG:   ECG taken at 0350, ECG read at 0400  Sinus tachycardia. Nonspecific T wave abnormality. Abnormal ECG.   Rate 101 bpm. NV interval 120 ms. QRS duration 78 ms. QT/QTc 334/433 ms. P-R-T axes 51 23 18.    Imaging:  XR Chest Portable 1 View:   Patchy bilateral perihilar infiltrates consistent with infectious/inflammatory pneumonitis. No pleural fluid or pneumothorax. Normal heart size and pulmonary vascularity. As per radiology.     Laboratory:  CBC: WBC: 4.0, HGB: 12.6, PLT: 207  BMP: Glucose 106 (H), BUN 6 (L), Creatinine 1.08 (H), Calcium 8.2 (L), o/w WNL   D dimer: 0.5  0354 Troponin I (now): <0.015  ISTAT HCG Quantitative: <5.0    UA with Microscopic: ph 7.5 (H), protein albumin 20 (A), squamous epithelial 3 (H), mucous present (A), o/w WNL    Emergency Department Course:  Reviewed:  I reviewed nursing notes, vitals and past medical history    Assessments:  0354 I physically examined the patient as documented above.    0453 I rechecked the patient. She denies new  concerns.     Interventions:  0418 NS 1L IV  0418 Toradol 15 mg IV    Disposition:  The patient was admitted to the hospital under the care of Dr. Jackson.     Impression & Plan       Medical Decision Making:  Katarzyna Brito is a 45-year-old female history of seasonal asthma who presents emergency department concerns for ongoing symptoms of Covid with worsening of chest pain and dyspnea.  Here she is not hypoxic except with exertion.  She is tachycardic.  She has had decreased oral intake and I suspect mild dehydration.  Given the tachycardia and chest pain in the setting of Covid, D-dimer test was checked.  It was not elevated enough that CT scan would likely be beneficial.  Chest x-ray did show evidence of bilateral pneumonitis, likely Covid related.  At rest she did not require supplemental oxygen.  She was stable but will be admitted due to her exertional hypoxia and severe Covid symptoms.  At this time no concern for PE, ACS, CHF.  All questions were answered prior to admission.  Decadron administered in the ED.    Covid-19  Katarzyna Brito was evaluated during a global COVID-19 pandemic, which necessitated consideration that the patient might be at risk for infection with the SARS-CoV-2 virus that causes COVID-19.   Applicable protocols for evaluation were followed during the patient's care. COVID-19 was considered as part of the patient's evaluation. The plan for testing is: a test was obtained at a previous visit and reviewed & considered today.    Diagnosis:    ICD-10-CM    1. Pneumonia due to 2019 novel coronavirus  U07.1     J12.82    2. Hypoxia  R09.02        Scribe Disclosure:  I, Geo Llanos, am serving as a scribe at 3:49 AM on 5/14/2021 to document services personally performed by Olga Valdez MD based on my observations and the provider's statements to me.      Olga Valdez MD  05/14/21 0552       Olga Valdez MD  05/14/21 0552

## 2021-05-14 NOTE — PROGRESS NOTES
Patient admitted by Dr. Jackson earlier.  Reviewed.    Patient is stable.  Requiring supplemental O2.  On remdesivir Decadron

## 2021-05-14 NOTE — H&P
[facility]  History and Physical Hospitalist       Date of Admission:  2021    Assessment & Plan   Katarzyna Brito is a 45 year old female with PMHx of asthma and migraines who presents to [facility] 21 with chest discomfort and worsening SOB secondary to COVID-19 pneumonia diagnosed 21.     COVID-19 viral pneumonia complicated by asthma: Worsening SOB/pleuritic chest pain. Labs unremarkable. CXR patchy bilateral perihilar infiltrates - pneumonitis.  Treated with Dexamethasone, toradol, and IVF in ED.   -COVID testin21 positive  -Isolation started 21  -Dexamethasone 6 mg daily started 21  -Remdesivir 200 mg once followed by 100 mg daily for 5 days (lung infiltrates; hypoxia with ambulation)  -D-dimer 0.5. Lovenox 40 mg daily   -Supportive cares: Albuterol prn, incentive spirometry, oxygen monitoring, acetaminophen  -COVID-19 labs ordered     Acute kidney injury: Baseline Cr 0.8. On admission 1.08. Monitor. Recheck in AM.     Migraines: Complicates cares    Obesity: Complicates cares    **Awaiting pharm med rec.     FEN: Oral hydration; check mag/phosphorus; regular  Activity: As tolerated   DVT Prophylaxis: Enoxaparin (Lovenox) SQ  Code Status: Full Code    Expected discharge: 1-2 days, recommended to prior living arrangement once SOB/chest discomfort improves. .    Aparna Jackson DO    Primary Care Physician   Maribel James    Chief Complaint   SOB  History is obtained from the patient, electronic health record and emergency department physician (Dr. Olga Valdez)    History of Present Illness   Katarzyna Brito is a 45 year old female with PMHx of asthma and migraines who presents to [facility] 21 with chest discomfort and worsening SOB secondary to COVID-19 pneumonia diagnosed 21.     Symptoms for roughly 1 week. Positive . Frequent cough with yellow sputum production. Increased SOB. Worse with activity. Fevers (max temp 101.8) Pleuritic chest pain. No  palpitations. No cardiac chest pain. Abdominal pain with diarrhea x5-6/day. Fatigue. Poor appetite. Not eating much. Attempting to drink fluids. No dysuria or hematuria. Dizziness with ambulation.     Past Medical History    I have reviewed this patient's medical history and updated it with pertinent information if needed.   Past Medical History:   Diagnosis Date     Asthma     as achildhood -resolved      Migraine      Obesity      Past Surgical History   I have reviewed this patient's surgical history and updated it with pertinent information if needed.  Past Surgical History:   Procedure Laterality Date      SECTION   & 2004    x2     ROTATOR CUFF REPAIR RT/LT Right 2017    [facility]     TONSILLECTOMY  age 25     Prior to Admission Medications   Prior to Admission Medications   Prescriptions Last Dose Informant Patient Reported? Taking?   Aspirin-Acetaminophen-Caffeine (EXCEDRIN MIGRAINE PO)   Yes No   Sig: Take  by mouth.   albuterol (PROAIR HFA) 108 (90 Base) MCG/ACT inhaler   No No   Sig: Inhale 2 puffs into the lungs every 6 hours as needed for shortness of breath / dyspnea   albuterol (PROVENTIL) (2.5 MG/3ML) 0.083% neb solution   No No   Sig: Take 1 vial (2.5 mg) by nebulization every 4 hours as needed for shortness of breath / dyspnea   cabergoline (DOSTINEX) 0.5 MG tablet   Yes No   Sig: TK 1 T PO Q WK   calcium carbonate 500 mg, elemental, (OSCAL;OYSTER SHELL CALCIUM) 500 MG tablet   Yes No   Sig: Take 500 mg by mouth   ibuprofen (ADVIL/MOTRIN) 600 MG tablet   No No   Sig: Take 1 tablet (600 mg) by mouth every 6 hours as needed      Facility-Administered Medications: None     Allergies   No Known Allergies    Social History   I have reviewed this patient's social history and updated it with pertinent information if needed. Katarzyna Brito  reports that she has quit smoking. She has never used smokeless tobacco. She reports that she does not drink alcohol or use drugs.    Family History    Family history reviewed with patient and is noncontributory.    Review of Systems   The 10 point Review of Systems is negative other than noted in the HPI    Physical Exam   Temp: 99.6  F (37.6  C) Temp src: Oral BP: 120/81 Pulse: 94   Resp: 20 SpO2: 95 %      Vital Signs with Ranges  Temp:  [99.6  F (37.6  C)] 99.6  F (37.6  C)  Pulse:  [] 94  Resp:  [20-27] 20  BP: (109-128)/(72-97) 120/81  SpO2:  [93 %-95 %] 95 %  0 lbs 0 oz    Constitutional: Awake, alert, cooperative, no apparent distress. Ill in appearance. Fatigued.   HEENT: AT. NC. Conjunctiva non-injected. Sclera anicteric. Pupils examined and normal. Wearing mask.   Respiratory: No use of accessory respiratory muscles. Clear to auscultation bilaterally, no crackles or wheezing. Diminished respiratory effort.   Cardiovascular: Regular rate and rhythm, normal S1 and S2, and no murmur noted.  GI: Obese, soft, non-distended, non-tender, normal bowel sounds. No organomegaly.   Lymph/Hematologic: No anterior cervical or supraclavicular adenopathy.  Skin: No rashes, no cyanosis, no edema.  Musculoskeletal: No joint swelling, erythema or tenderness. Generalized musculoskeletal tenderness.   Neurologic: Cranial nerves 2-12 intact, normal strength and sensation.  Psychiatric: Alert, oriented to person, place and time, no obvious anxiety or depression.    Data   Data reviewed today:  I personally reviewed     Recent Labs   Lab 05/14/21  0354   WBC 4.0   HGB 12.6   MCV 83         POTASSIUM 4.0   CHLORIDE 107   CO2 28   BUN 6*   CR 1.08*   ANIONGAP 3   BOBBY 8.2*   *   TROPI <0.015     Recent Results (from the past 24 hour(s))   XR Chest Port 1 View    Narrative    EXAM: XR CHEST PORT 1 VIEW  LOCATION: [facility]  DATE/TIME: 5/14/2021 4:24 AM    INDICATION: Difficulty breathing and chest pain.  COMPARISON: 01/02/2020.      Impression    IMPRESSION: Patchy bilateral perihilar infiltrates consistent with infectious/inflammatory  pneumonitis. No pleural fluid or pneumothorax. Normal heart size and pulmonary vascularity.

## 2021-05-14 NOTE — ED NOTES
[facility]  ED Nurse Handoff Report    Katarzyna Brito is a 45 year old female   ED Chief complaint: Covid Concern  . ED Diagnosis:   Final diagnoses:   Pneumonia due to 2019 novel coronavirus   Hypoxia     Allergies: No Known Allergies    Code Status: Full Code  Activity level - Baseline/Home:  Independent. Activity Level - Current:   Stand by Assist. Lift room needed: No. Bariatric: No   Needed: No   Isolation: Yes. Infection: Not Applicable  COVID r/o and special precautions.     Vital Signs:   Vitals:    05/14/21 0345 05/14/21 0355 05/14/21 0400 05/14/21 0430   BP: 128/86  109/72 (!) 127/97   Pulse: 116 111 120 105   Resp: 24 27 25 24   Temp: 99.6  F (37.6  C)      TempSrc: Oral      SpO2: 93% 95%  94%       Cardiac Rhythm:  ,      Pain level:    Patient confused: No. Patient Falls Risk: Yes.   Elimination Status: Has voided   Patient Report - Initial Complaint: Katarzyna Brito is a 45 year old female with a history of seasonal asthma who presents for evaluation of chest pain and shortness of breath in the setting of Covid positive test on Wednesday.  She notes her symptoms started 1 week ago.  They include body aches, fevers and chills, poor appetite, cough productive of clear sputum sometimes streaked with blood, chest pain has become more persistent today and is substernal, upper abdominal discomfort associate with nausea, and diarrhea that is not black or bloody.  She denies vomiting.  She notes the shortness of breath is worse with exertion.  She denies any new leg swelling or asymmetric pain.  She had wheezing yesterday but otherwise denies any wheezing currently.  She notes she has had minimal to eat and has been drinking less than normal.  She notes burning with urination but no hematuria or urinary frequency. Focused Assessment:   Respiratory Respiratory - Respiratory WDL: rhythm/pattern; effort/expansion; cough  Rhythm/Pattern, Respiratory: tachypnea; shortness of breath   Expansion/Accessory Muscles/Retractions: no retractions; no use of accessory muscles; expansion symmetric  Cough Frequency: frequent  Cough Type: loose; productive      Gastrointestinal Gastrointestinal - Gastrointestinal WDL: GI symptoms  GI Signs/Symptoms: abdominal discomfort (generalized abdominal pain)     Cardiac Cardiac - Cardiac WDL: chest pain  Capillary Refill, General: less than/equal to 3 secs   Review of Systems (Cardiac) - Cardiovascular Symptoms/Conditions: chest pain (midsternal chest pain)     Tests Performed: labs, CXR. Abnormal Results:   Labs Ordered and Resulted from Time of ED Arrival Up to the Time of Departure from the ED   CBC WITH PLATELETS DIFFERENTIAL - Abnormal; Notable for the following components:       Result Value    MCH 26.3 (*)     All other components within normal limits   BASIC METABOLIC PANEL - Abnormal; Notable for the following components:    Glucose 106 (*)     Urea Nitrogen 6 (*)     Creatinine 1.08 (*)     Calcium 8.2 (*)     All other components within normal limits   ROUTINE UA WITH MICROSCOPIC - Abnormal; Notable for the following components:    pH Urine 7.5 (*)     Protein Albumin Urine 20 (*)     Squamous Epithelial /HPF Urine 3 (*)     Mucous Urine Present (*)     All other components within normal limits   D DIMER QUANTITATIVE   TROPONIN I   PERIPHERAL IV CATHETER   ISTAT HCG QUANTITATIVE PREGNANCY NURSING POCT   ISTAT HCG QUANTITATIVE PREGNANCY POCT     XR Chest Port 1 View   Final Result   IMPRESSION: Patchy bilateral perihilar infiltrates consistent with infectious/inflammatory pneumonitis. No pleural fluid or pneumothorax. Normal heart size and pulmonary vascularity.         Treatments provided: Fluids, Decadron, Monitor  Family Comments: NA  OBS brochure/video discussed/provided to patient:  Yes  ED Medications:   Medications   0.9% sodium chloride BOLUS (1,000 mLs Intravenous New Bag 5/14/21 0418)     Followed by   sodium chloride 0.9% infusion (has no  administration in time range)   ketorolac (TORADOL) injection 15 mg (15 mg Intravenous Given 5/14/21 0590)   dexamethasone PF (DECADRON) injection 6 mg (6 mg Intravenous Given 5/14/21 6778)     Drips infusing:  Yes - IV fluids infusing  For the majority of the shift, the patient's behavior Green. Interventions performed were NA.    Sepsis treatment initiated: No     Patient tested for COVID 19 prior to admission: NO - Patient is known positive for covid    ED Nurse Name/Phone Number: Dany Castellon RN,   5:02 AM    RECEIVING UNIT ED HANDOFF REVIEW    Above ED Nurse Handoff Report was reviewed: Yes  Reviewed by: Maria Alejandra Noriega RN on May 14, 2021 at 11:39 AM

## 2021-05-14 NOTE — ED NOTES
"Ambulated patient independently, on room air, while monitoring her oxygen level. Patient's oxygen saturation immediately dropped from 99% to 84% with a good pleath. Patient stated feeling \"dizzy\" and requested to sit down. Upon sitting on edge of litter her oxygen saturation returned to 99%. Informed MD and RN, gave patient call light , allowing her to remain on edge of litter with one railing down as her gait was steady enough that falling from litter is not a concern.  "

## 2021-05-14 NOTE — PHARMACY-ADMISSION MEDICATION HISTORY
Admission medication history interview status for this patient is complete. See [facility] admission navigator for allergy information, prior to admission medications and immunization status.     Medication history interview done, indicate source(s): Patient  Medication history resources (including written lists, pill bottles, clinic record):[facility] list, care everywhere  Pharmacy:  [facility], [place]    Changes made to PTA medication list:  Added: dose for Excedrin  Deleted: Oscal 500 mg, ibuprofen 600 mg.  Changed: none    Actions taken by pharmacist (provider contacted, etc):None     Additional medication history information:None    Medication reconciliation/reorder completed by provider prior to medication history?  N   (Y/N)         Prior to Admission medications    Medication Sig Last Dose Taking? Auth Provider   Aspirin-Acetaminophen-Caffeine (EXCEDRIN MIGRAINE PO) Take 2 tablets by mouth every 6 hours as needed  5/13/2021 at Unknown time Yes Reported, Patient   albuterol (PROAIR HFA) 108 (90 Base) MCG/ACT inhaler Inhale 2 puffs into the lungs every 6 hours as needed for shortness of breath / dyspnea More than a month at (last winter)  Kennedi Hutchison,    albuterol (PROVENTIL) (2.5 MG/3ML) 0.083% neb solution Take 1 vial (2.5 mg) by nebulization every 4 hours as needed for shortness of breath / dyspnea More than a month at (last winter)  Maribel James MD   cabergoline (DOSTINEX) 0.5 MG tablet Take 0.5 mg by mouth once a week TK 1 T PO Q WK More than a month at Unknown time  Maribel James MD

## 2021-05-14 NOTE — PROGRESS NOTES
"[facility] RCAT  Date: May 14, 2021  Admission Dx: COVID PNA  Pulmonary History: Asthma  Home Nebulizer/MDI Use: Albuterol prn  Home Oxygen: None  Acuity Level (RCAT flow sheet): Level 3    Aerosol Therapy initiated: Albuterol MDI QID    Pulmonary Hygiene initiated: Coughing and deep breathing techniques    Volume Expansion initiated: Incentive spirometer    Current Oxygen Requirements: 2L NC  Current SpO2: 97%    Re-evaluation date: 5/17/2021    Patient Education: Education was provided on RCAT process. Will continue to do education with patient.    See \"RT Assessments\" flow sheet for patient assessment scoring and Acuity Level Details.     Jovani Elmore, RT on 5/14/2021 at 4:48 PM      "

## 2021-05-15 LAB
ANION GAP SERPL CALCULATED.3IONS-SCNC: 7 MMOL/L (ref 3–14)
BUN SERPL-MCNC: 13 MG/DL (ref 7–30)
CALCIUM SERPL-MCNC: 8.1 MG/DL (ref 8.5–10.1)
CHLORIDE SERPL-SCNC: 110 MMOL/L (ref 94–109)
CO2 SERPL-SCNC: 24 MMOL/L (ref 20–32)
CREAT SERPL-MCNC: 0.92 MG/DL (ref 0.52–1.04)
CRP SERPL-MCNC: 28 MG/L (ref 0–8)
D DIMER PPP FEU-MCNC: 0.4 UG/ML FEU (ref 0–0.5)
ERYTHROCYTE [DISTWIDTH] IN BLOOD BY AUTOMATED COUNT: 13.6 % (ref 10–15)
GFR SERPL CREATININE-BSD FRML MDRD: 75 ML/MIN/{1.73_M2}
GLUCOSE SERPL-MCNC: 132 MG/DL (ref 70–99)
HCT VFR BLD AUTO: 36.8 % (ref 35–47)
HGB BLD-MCNC: 11.3 G/DL (ref 11.7–15.7)
MCH RBC QN AUTO: 25.9 PG (ref 26.5–33)
MCHC RBC AUTO-ENTMCNC: 30.7 G/DL (ref 31.5–36.5)
MCV RBC AUTO: 84 FL (ref 78–100)
PLATELET # BLD AUTO: 196 10E9/L (ref 150–450)
POTASSIUM SERPL-SCNC: 3.6 MMOL/L (ref 3.4–5.3)
RBC # BLD AUTO: 4.37 10E12/L (ref 3.8–5.2)
SODIUM SERPL-SCNC: 141 MMOL/L (ref 133–144)
WBC # BLD AUTO: 3.7 10E9/L (ref 4–11)

## 2021-05-15 PROCEDURE — 86140 C-REACTIVE PROTEIN: CPT | Performed by: INTERNAL MEDICINE

## 2021-05-15 PROCEDURE — 258N000003 HC RX IP 258 OP 636: Performed by: INTERNAL MEDICINE

## 2021-05-15 PROCEDURE — 250N000013 HC RX MED GY IP 250 OP 250 PS 637: Performed by: INTERNAL MEDICINE

## 2021-05-15 PROCEDURE — 85379 FIBRIN DEGRADATION QUANT: CPT | Performed by: INTERNAL MEDICINE

## 2021-05-15 PROCEDURE — 96372 THER/PROPH/DIAG INJ SC/IM: CPT | Performed by: INTERNAL MEDICINE

## 2021-05-15 PROCEDURE — 85027 COMPLETE CBC AUTOMATED: CPT | Performed by: INTERNAL MEDICINE

## 2021-05-15 PROCEDURE — 96376 TX/PRO/DX INJ SAME DRUG ADON: CPT

## 2021-05-15 PROCEDURE — 250N000009 HC RX 250: Performed by: INTERNAL MEDICINE

## 2021-05-15 PROCEDURE — 250N000012 HC RX MED GY IP 250 OP 636 PS 637: Performed by: INTERNAL MEDICINE

## 2021-05-15 PROCEDURE — 99225 PR SUBSEQUENT OBSERVATION CARE,LEVEL II: CPT | Performed by: INTERNAL MEDICINE

## 2021-05-15 PROCEDURE — G0378 HOSPITAL OBSERVATION PER HR: HCPCS

## 2021-05-15 PROCEDURE — 80048 BASIC METABOLIC PNL TOTAL CA: CPT | Performed by: INTERNAL MEDICINE

## 2021-05-15 PROCEDURE — 99207 PR CDG-CODE CATEGORY CHANGED: CPT | Performed by: INTERNAL MEDICINE

## 2021-05-15 PROCEDURE — 36415 COLL VENOUS BLD VENIPUNCTURE: CPT | Performed by: INTERNAL MEDICINE

## 2021-05-15 PROCEDURE — 250N000011 HC RX IP 250 OP 636: Performed by: INTERNAL MEDICINE

## 2021-05-15 RX ORDER — BENZONATATE 100 MG/1
100 CAPSULE ORAL 3 TIMES DAILY PRN
Status: DISCONTINUED | OUTPATIENT
Start: 2021-05-15 | End: 2021-05-16 | Stop reason: HOSPADM

## 2021-05-15 RX ADMIN — REMDESIVIR 100 MG: 100 INJECTION, POWDER, LYOPHILIZED, FOR SOLUTION INTRAVENOUS at 17:05

## 2021-05-15 RX ADMIN — GUAIFENESIN 10 ML: 100 SOLUTION ORAL at 21:30

## 2021-05-15 RX ADMIN — SODIUM CHLORIDE 50 ML: 9 INJECTION, SOLUTION INTRAVENOUS at 18:50

## 2021-05-15 RX ADMIN — BENZONATATE 100 MG: 100 CAPSULE ORAL at 01:59

## 2021-05-15 RX ADMIN — ENOXAPARIN SODIUM 40 MG: 40 INJECTION SUBCUTANEOUS at 12:21

## 2021-05-15 RX ADMIN — BENZONATATE 100 MG: 100 CAPSULE ORAL at 17:13

## 2021-05-15 RX ADMIN — ALBUTEROL SULFATE 2 PUFF: 90 INHALANT RESPIRATORY (INHALATION) at 12:23

## 2021-05-15 RX ADMIN — Medication 1 LOZENGE: at 21:30

## 2021-05-15 RX ADMIN — ALBUTEROL SULFATE 2 PUFF: 90 AEROSOL, METERED RESPIRATORY (INHALATION) at 15:41

## 2021-05-15 RX ADMIN — DEXAMETHASONE 6 MG: 2 TABLET ORAL at 12:21

## 2021-05-15 NOTE — PLAN OF CARE
To Do:  End of Shift Summary  For vital signs and complete assessments, please see documentation flowsheets.     Pertinent assessments: Patient Aox4 this shift. Denies having any pain. Complains of generalized lower extremity aches, refused interventions. Complains of SOB and PAZ, on 2 liters NC.     Major Shift Events: Admitted to floor around 1330.     Treatment Plan: Wean Oxygen as patient can tolerate. Decadron, Lovenox and Remdesivir.     Bedside Nurse: Maria Alejandra Noriega RN

## 2021-05-15 NOTE — UTILIZATION REVIEW
Concurrent stay review; Secondary Review Determination    Under the authority of the Utilization Management Committee, the utilization review process indicated a secondary review on the above patient. The review outcome is based on review of the medical records, discussions with staff, and applying clinical experience noted on the date of the review.    (x) Observation Status Appropriate - Concurrent stay review        RATIONALE FOR DETERMINATION:  45-year-old female with history of asthma and a positive diagnosis of COVID-19 with symptoms on 5/12/2021.  Patient developed worsening chest discomfort and shortness of breath and presented to the hospital.  Besides patient's significant obesity, Covid risk factors for severe disease not present and patient is O2 sats were normal at rest but patient had some mild hypoxemia with activity.  Observation care appropriate to initiate COVID-19 management and to monitor for any signs or symptoms of progressive worsening disease.  If the latter occurs, then patient would be appropriate to advance to inpatient services.    Patient is clinically improving and there is no clear indication to change patient's status to inpatient. The severity of illness, intensity of service provided, expected LOS and risk for adverse outcome make the care appropriate for observation.    This document was produced using voice recognition software    The information on this document is developed by the utilization review team in order for the business office to ensure compliance. This only denotes the appropriateness of proper admission status and does not reflect the quality of care rendered.    The definitions of Inpatient Status and Observation Status used in making the determination above are those provided in the CMS Coverage Manual, Chapter 1 and Chapter 6, section 70.4.    Sincerely,    Arnav Nick MD  Utilization Review  Physician Advisor  [facility].

## 2021-05-15 NOTE — PLAN OF CARE
End of Shift Summary  For vital signs and complete assessments, please see documentation flowsheets.     Pertinent assessments: VSS, denies pain. On 2 L nasal cannula for SOB/PAZ. Lung sounds diminished. Nonproductive cough, tessalon given and helped patient get some sleep.     Major Shift Events: Uneventful.   Treatment Plan: Wean Oxygen as patient can tolerate. Decadron, Lovenox and Remdesivir.

## 2021-05-15 NOTE — PLAN OF CARE
End of Shift Summary  For vital signs and complete assessments, please see documentation flowsheets.     Pertinent assessments: A&O, VSS. Denies SOB, lungs diminished. Tessalon given for cough. Tolerating room air, sats >92%. 2L Oxy-mask while sleeping for comfort. Up ind.    Major Shift Events: Weaned to room air    Treatment Plan: Decadron, Remdesivir, Lovenox, Nebs 4x daily. Possible discharge tomorrow.   Presence Of Scar Tissue (For Histology): present

## 2021-05-15 NOTE — PLAN OF CARE
To Do:  End of Shift Summary  For vital signs and complete assessments, please see documentation flowsheets.     Pertinent assessments: Patient is Aox4 this shift. Denies having any pain. PAZ. Clear but diminished lung sounds. Generalized body aches. Frequent dry and non productive cough.     Major Shift Events: Weaned down from 2 liters O2 to 1 liter NC.     Treatment Plan: Wean O2 as patient can tolerate. Decadron, Lovenox and Remdesivir.     Bedside Nurse: Maria Alejandra Noriega RN

## 2021-05-15 NOTE — PROVIDER NOTIFICATION
MD paged. Patient is requesting something for her frequent cough in hopes that she might get some sleep.

## 2021-05-15 NOTE — PLAN OF CARE
"PRIMARY DIAGNOSIS: \"GENERIC\" NURSING  OUTPATIENT/OBSERVATION GOALS TO BE MET BEFORE DISCHARGE:  ADLs back to baseline: No    Activity and level of assistance: Up with standby assistance.    Pain status: Pain free.    Return to near baseline physical activity: No     Discharge Planner Nurse   Safe discharge environment identified: No  Barriers to discharge: Yes       Entered by: Natividad Whitman 05/15/2021 12:20 AM     Please review provider order for any additional goals.   Nurse to notify provider when observation goals have been met and patient is ready for discharge.    "

## 2021-05-15 NOTE — PROGRESS NOTES
[facility]    Hospitalist Progress Note  Provider : Clinton Rodriguez MD  Date of Service (when I saw the patient): 05/15/2021    Assessment & Plan   Katarzyna Brito is a 45 year old female with PMHx of asthma and migraines who presents to [facility] 21 with chest discomfort and worsening SOB secondary to COVID-19 pneumonia diagnosed 21.      COVID-19 viral pneumonia complicated by asthma: Worsening SOB/pleuritic chest pain. Labs unremarkable. CXR patchy bilateral perihilar infiltrates - pneumonitis. Treated with Dexamethasone, toradol, and IVF in ED.   -COVID testin21 positive  -Isolation started 21  -Dexamethasone 6 mg daily started 21  -Remdesivir 200 mg once followed by 100 mg daily for 5 days (lung infiltrates; hypoxia with ambulation)  -D-dimer 0.5. Lovenox 40 mg daily   -Supportive cares: Albuterol prn, incentive spirometry, oxygen monitoring, acetaminophen  -COVID-19 labs ordered      Acute kidney injury: Baseline Cr 0.8. On admission 1.08. Creatinine 0.92 today.    Migraines: Complicates cares     Obesity: Complicates cares     FEN: Oral hydration; check mag/phosphorus; regular  Activity: As tolerated   DVT Prophylaxis: Enoxaparin (Lovenox) SQ  Code Status: Full Code     Expected discharge: 1-2 days, recommended to prior living arrangement once SOB/chest discomfort improves. .     DVT Prophylaxis: Pneumatic Compression Devices  Code Status: Full Code    Disposition: Expected discharge in 1-2 days    Clinton Rodriguez MD    Interval History   Patient seen and examined. She stated that she is still having shortness of breath. She has no chest pain. She has no nausea or vomiting. She has no abdominal pain. No fever.     -Data reviewed today: I reviewed all new labs and imaging results over the last 24 hours. I personally reviewed     Physical Exam   Temp: 98  F (36.7  C) Temp src: Oral BP: 136/71 Pulse: 70   Resp: 20 SpO2: 96 % O2 Device: Nasal cannula Oxygen  Delivery: 1 LPM  Vitals:    05/14/21 2006   Weight: 111.2 kg (245 lb 1.6 oz)     Vital Signs with Ranges  Temp:  [97.9  F (36.6  C)-98.4  F (36.9  C)] 98  F (36.7  C)  Pulse:  [70-77] 70  Resp:  [18-20] 20  BP: (125-141)/(71-87) 136/71  SpO2:  [96 %-99 %] 96 %  I/O last 3 completed shifts:  In: 360 [P.O.:360]  Out: -     GEN:  Alert, oriented x 3, appears comfortable, NAD.  HEENT:  Normocephalic/atraumatic, no scleral icterus, no nasal discharge, mouth moist.  CV:  Regular rate and rhythm, no murmur or JVD.  S1 + S2 noted, no S3 or S4.  LUNGS:  Clear to auscultation bilaterally without rales/rhonchi/wheezing/retractions.  Symmetric chest rise on inhalation noted.  ABD:  Active bowel sounds, soft, non-tender/non-distended.  No rebound/guarding/rigidity.  EXT:  No edema or cyanosis.  Hands/feet warm to touch with good signs of peripheral perfusion.  No joint synovitis noted.  SKIN:  Dry to touch, no exanthems noted in the visualized areas.  NEURO:  Symmetric muscle strength, sensation to touch grossly intact.  No new focal deficits appreciated.    Medications     - MEDICATION INSTRUCTIONS -         remdesivir  100 mg Intravenous Q24H    And     sodium chloride 0.9%  50 mL Intravenous Q24H     albuterol  2 puff Inhalation 4x daily     dexamethasone  6 mg Oral Daily     enoxaparin ANTICOAGULANT  40 mg Subcutaneous Q24H     sodium chloride (PF)  3 mL Intracatheter Q8H       Data   Recent Labs   Lab 05/15/21  0841 05/14/21  0354   WBC 3.7* 4.0   HGB 11.3* 12.6   MCV 84 83    207    138   POTASSIUM 3.6 4.0   CHLORIDE 110* 107   CO2 24 28   BUN 13 6*   CR 0.92 1.08*   ANIONGAP 7 3   BOBBY 8.1* 8.2*   * 106*   ALBUMIN  --  3.0*   PROTTOTAL  --  7.6   BILITOTAL  --  0.5   ALKPHOS  --  84   ALT  --  30   AST  --  32   TROPI  --  <0.015       No results found for this or any previous visit (from the past 24 hour(s)).

## 2021-05-16 VITALS
BODY MASS INDEX: 41.85 KG/M2 | HEIGHT: 64 IN | RESPIRATION RATE: 16 BRPM | WEIGHT: 245.1 LBS | HEART RATE: 75 BPM | TEMPERATURE: 98 F | DIASTOLIC BLOOD PRESSURE: 83 MMHG | OXYGEN SATURATION: 96 % | SYSTOLIC BLOOD PRESSURE: 143 MMHG

## 2021-05-16 LAB
ANION GAP SERPL CALCULATED.3IONS-SCNC: 4 MMOL/L (ref 3–14)
BUN SERPL-MCNC: 12 MG/DL (ref 7–30)
CALCIUM SERPL-MCNC: 8 MG/DL (ref 8.5–10.1)
CHLORIDE SERPL-SCNC: 112 MMOL/L (ref 94–109)
CO2 SERPL-SCNC: 26 MMOL/L (ref 20–32)
CREAT SERPL-MCNC: 0.86 MG/DL (ref 0.52–1.04)
CRP SERPL-MCNC: 19 MG/L (ref 0–8)
D DIMER PPP FEU-MCNC: 0.4 UG/ML FEU (ref 0–0.5)
ERYTHROCYTE [DISTWIDTH] IN BLOOD BY AUTOMATED COUNT: 13.6 % (ref 10–15)
GFR SERPL CREATININE-BSD FRML MDRD: 82 ML/MIN/{1.73_M2}
GLUCOSE SERPL-MCNC: 86 MG/DL (ref 70–99)
HCT VFR BLD AUTO: 37.7 % (ref 35–47)
HGB BLD-MCNC: 11.7 G/DL (ref 11.7–15.7)
MCH RBC QN AUTO: 26.1 PG (ref 26.5–33)
MCHC RBC AUTO-ENTMCNC: 31 G/DL (ref 31.5–36.5)
MCV RBC AUTO: 84 FL (ref 78–100)
PLATELET # BLD AUTO: 232 10E9/L (ref 150–450)
POTASSIUM SERPL-SCNC: 3.6 MMOL/L (ref 3.4–5.3)
RBC # BLD AUTO: 4.49 10E12/L (ref 3.8–5.2)
SODIUM SERPL-SCNC: 142 MMOL/L (ref 133–144)
WBC # BLD AUTO: 3.6 10E9/L (ref 4–11)

## 2021-05-16 PROCEDURE — 80048 BASIC METABOLIC PNL TOTAL CA: CPT | Performed by: INTERNAL MEDICINE

## 2021-05-16 PROCEDURE — 94640 AIRWAY INHALATION TREATMENT: CPT

## 2021-05-16 PROCEDURE — G0378 HOSPITAL OBSERVATION PER HR: HCPCS

## 2021-05-16 PROCEDURE — 999N000157 HC STATISTIC RCP TIME EA 10 MIN

## 2021-05-16 PROCEDURE — 85027 COMPLETE CBC AUTOMATED: CPT | Performed by: INTERNAL MEDICINE

## 2021-05-16 PROCEDURE — 85379 FIBRIN DEGRADATION QUANT: CPT | Performed by: INTERNAL MEDICINE

## 2021-05-16 PROCEDURE — 36415 COLL VENOUS BLD VENIPUNCTURE: CPT | Performed by: INTERNAL MEDICINE

## 2021-05-16 PROCEDURE — 86140 C-REACTIVE PROTEIN: CPT | Performed by: INTERNAL MEDICINE

## 2021-05-16 RX ORDER — ALBUTEROL SULFATE 90 UG/1
2 AEROSOL, METERED RESPIRATORY (INHALATION) EVERY 6 HOURS PRN
Qty: 8 G | Refills: 0 | Status: SHIPPED | OUTPATIENT
Start: 2021-05-16 | End: 2021-06-23

## 2021-05-16 RX ORDER — ALBUTEROL SULFATE 0.83 MG/ML
2.5 SOLUTION RESPIRATORY (INHALATION) EVERY 4 HOURS PRN
Qty: 3 ML | Refills: 0 | Status: SHIPPED | OUTPATIENT
Start: 2021-05-16 | End: 2021-06-23

## 2021-05-16 RX ORDER — DEXAMETHASONE 6 MG/1
6 TABLET ORAL DAILY
Qty: 7 TABLET | Refills: 0 | Status: SHIPPED | OUTPATIENT
Start: 2021-05-16 | End: 2021-05-27 | Stop reason: ALTCHOICE

## 2021-05-16 RX ADMIN — ALBUTEROL SULFATE 2 PUFF: 90 AEROSOL, METERED RESPIRATORY (INHALATION) at 12:04

## 2021-05-16 ASSESSMENT — ACTIVITIES OF DAILY LIVING (ADL): DEPENDENT_IADLS:: INDEPENDENT

## 2021-05-16 NOTE — PLAN OF CARE
A/O, denies pain. HR reg, pulses palpable, no edema. RA, occasional productive cough with activity and coughing, lungs dim with course bases, afebrile. BS+, no N/V, tolerating diet, poor appetite, obese, passing gas, LBM yesterday. Voiding independent in BR, steady. IV DC'd. Awaiting medications from pharmacy. MD provided update to family.

## 2021-05-16 NOTE — PROGRESS NOTES
Provided patient with discontinue instructions and reminded medications have been filled at [facility]'s per patient request. Patient denies any questions, states has all belongings. Transport home via spose.

## 2021-05-16 NOTE — PLAN OF CARE
"PRIMARY DIAGNOSIS: \"GENERIC\" NURSING  OUTPATIENT/OBSERVATION GOALS TO BE MET BEFORE DISCHARGE:  ADLs back to baseline: Yes    Activity and level of assistance: Ambulating independently.    Pain status: Pain free.    Return to near baseline physical activity: Yes     Discharge Planner Nurse   Safe discharge environment identified: Yes  Barriers to discharge: No       Entered by: Natividad Kimball 05/16/2021 1:46 AM     Please review provider order for any additional goals.   Nurse to notify provider when observation goals have been met and patient is ready for discharge.    Pt up independently.  Denies pain.  Some PAZ and infrequent dry cough, on RA.  Mild nausea tonight, declined interventions.    "

## 2021-05-16 NOTE — PROGRESS NOTES
"PRIMARY DIAGNOSIS: \"GENERIC\" NURSING  OUTPATIENT/OBSERVATION GOALS TO BE MET BEFORE DISCHARGE:  1. ADLs back to baseline: Yes    2. Activity and level of assistance: Ambulating independently.    3. Pain status: Pain free.    4. Return to near baseline physical activity: Yes     Discharge Planner Nurse   Safe discharge environment identified: Yes  Barriers to discharge: No: weaned off oxygen, tolerating room air with sats >92%       Entered by: Juliet Danielle 05/15/2021 9:42 PM     Please review provider order for any additional goals.   Nurse to notify provider when observation goals have been met and patient is ready for discharge.  "

## 2021-05-16 NOTE — PLAN OF CARE
"PRIMARY DIAGNOSIS: \"GENERIC\" NURSING  OUTPATIENT/OBSERVATION GOALS TO BE MET BEFORE DISCHARGE:  ADLs back to baseline: Yes    Activity and level of assistance: Ambulating independently.    Pain status: Pain free.    Return to near baseline physical activity: Yes     Discharge Planner Nurse   Safe discharge environment identified: Yes-home  Barriers to discharge: Yes-O2 at night requirements to be addressed by MD.       Entered by: Shima Friedman 05/16/2021 9:37 AM     Please review provider order for any additional goals.   Nurse to notify provider when observation goals have been met and patient is ready for discharge.  "

## 2021-05-16 NOTE — PLAN OF CARE
"PRIMARY DIAGNOSIS: \"GENERIC\" NURSING  OUTPATIENT/OBSERVATION GOALS TO BE MET BEFORE DISCHARGE:  ADLs back to baseline: Yes    Activity and level of assistance: Ambulating independently.    Pain status: Pain free.    Return to near baseline physical activity: Yes     Discharge Planner Nurse   Safe discharge environment identified: Yes  Barriers to discharge: No       Entered by: Natividad Kimball 05/16/2021 6:25 AM     Please review provider order for any additional goals.   Nurse to notify provider when observation goals have been met and patient is ready for discharge.    End of Shift Summary  For vital signs and complete assessments, please see documentation flowsheets.     Pertinent assessments: A&O, VSS. Denies pain.  Denies SOB, lungs diminished, some PAZ, on RA during the day, 2L facemask tonight for comfort.  Dry/nonproductive infrequent cough. Mild nausea tonight, declined intervention.  Up independently.      Major Shift Events: uneventful    Treatment Plan: Decadron, Remdesivir, Lovenox, Nebs 4x daily. Possible discharge today    Bedside Nurse: Natividad Kimball RN        "

## 2021-05-22 ENCOUNTER — NURSE TRIAGE (OUTPATIENT)
Dept: NURSING | Facility: CLINIC | Age: 46
End: 2021-05-22

## 2021-05-22 NOTE — TELEPHONE ENCOUNTER
"Discharged from hospital for COVID and bilateral pneumonia 5 days ago. SX began 14 days ago. She is took her last Dexamethasone today (7d RX: 6mg once daily). She is still on Elliquis.  She is complaining of shortness of breath today with activity. OK @ rest. Slight cough. Really tired, felt better yesterday.   She does not have a pulse oximeter.  Follow up appointment already scheduled for next Thursday with primary MD.     Triaged to a disposition of See PCP within 3 days. Call transferred to  to see if an earlier appointment is available.     Patricia Escobar RN Triage Nurse Advisor 5:20 PM 5/22/2021    Reason for Disposition    [1] Finished taking antibiotics AND [2] no improvement (e.g., \"not feeling any better\")    [1] Finished taking antibiotics AND [2] breathing not back to normal (e.g., \"breathing still worse than normal\")    Additional Information    Negative: Severe difficulty breathing (e.g., struggling for each breath, speaks in single words)    Negative: Bluish (or gray) lips or face now    Negative: Difficult to awaken or acting confused (e.g., disoriented, slurred speech)    Negative: Stridor    Negative: Slow, shallow and weak breathing    Negative: Sounds like a life-threatening emergency to the triager    Negative: New onset or worsening chest pain    Negative: MODERATE difficulty breathing (e.g., speaks in phrases, SOB even at rest, pulse 100-120)    Negative: Fever > 103 F (39.4 C)    Negative: [1] Coughed up blood AND [2] large amount or blood clots (Exception: blood-tinged sputum)    Negative: Patient sounds very sick or weak to the triager    Negative: [1] Caller has URGENT question AND [2] triager unable to answer question    Negative: [1] MILD difficulty breathing  (e.g., minimal/no SOB at rest, SOB with walking, pulse <100) AND [2] worse than when discharged from hospital    Negative: Fever > 100.4 F (38.0 C)    Negative: Continuous (nonstop) coughing interferes with work or " "school    Negative: [1] Caller has NON-URGENT question AND [2] triager unable to answer question    Negative: [1] Taking antibiotics > 5 days after hospital discharge for pneumonia AND [2] no improvement (e.g., \"not feeling any better\")    Protocols used: PNEUMONIA ON ANTIBIOTIC POST-HOSPITALIZATION FOLLOW-UP CALL-A-  COVID 19 Nurse Triage Plan/Patient Instructions    Please be aware that novel coronavirus (COVID-19) may be circulating in the community. If you develop symptoms such as fever, cough, or SOB or if you have concerns about the presence of another infection including coronavirus (COVID-19), please contact your health care provider or visit [email or web address].     Disposition/Instructions    In-Person Visit with provider recommended. Reference Visit Selection Guide.    Thank you for taking steps to prevent the spread of this virus.  o Limit your contact with others.  o Wear a simple mask to cover your cough.  o Wash your hands well and often.    Resources    [facility]: About COVID-19: [email or web address]    CDC: What to Do If You're Sick: www.cdc.gov/coronavirus/2019-ncov/about/steps-when-sick.html    CDC: Ending Home Isolation: www.cdc.gov/coronavirus/2019-ncov/hcp/disposition-in-home-patients.html     CDC: Caring for Someone: www.cdc.gov/coronavirus/2019-ncov/if-you-are-sick/care-for-someone.html     [facility]: Interim Guidance for Hospital Discharge to Home: www.health.[facility].mn.us/diseases/coronavirus/hcp/hospdischarge.pdf    [facility] clinical trials (COVID-19 research studies): [email or web address]-clinical-trials     Below are the COVID-19 hotlines at the [facility] ([facility]). Interpreters are available.   o For health questions: Call 789-388-7752 or 1-920.166.2123 (7 a.m. to 7 p.m.)  o For questions about schools and childcare: Call 003-852-2193 or 1-261.116.1978 (7 a.m. to 7 p.m.)                     "

## 2021-05-23 NOTE — DISCHARGE SUMMARY
[facility]    Discharge Summary  Hospitalist    Date of Admission:  2021  Date of Discharge:  2021 12:19 PM  Discharging Provider: Clinton Rodriguez MD  Date of Service (when I saw the patient): 2021    Discharge Diagnoses     COVID-19 viral pneumonia complicated by asthma     Acute kidney injury    Migraines     Obesity: Complicates cares     History of Present Illness   Katarzyna Brito is an 45 year old female who presented with Covid-19 pneumonia    Hospital Course   Katarznya Brito is a 45 year old female with PMHx of asthma and migraines who presents to [facility] 21 with chest discomfort and worsening SOB secondary to COVID-19 pneumonia diagnosed 21.      COVID-19 viral pneumonia complicated by asthma: Worsening SOB/pleuritic chest pain. Labs unremarkable. CXR patchy bilateral perihilar infiltrates - pneumonitis. Treated with Dexamethasone, toradol, and IVF in ED.   -COVID testin21 positive  -Isolation started 21  -Dexamethasone 6 mg daily started 21. Discharged on dexamethasone   -Remdesivir 200 mg once followed by 100 mg daily for 5 days (lung infiltrates; hypoxia with ambulation)  -D-dimer 0.5. Lovenox 40 mg daily   -Supportive cares: Albuterol prn, incentive spirometry, oxygen monitoring, acetaminophen    Acute kidney injury: Baseline Cr 0.8. On admission 1.08. Creatinine remained stable..    Migraines: Complicates cares     Obesity: Complicates cares     Significant Results and Procedures   Results for orders placed or performed during the hospital encounter of 21   XR Chest Port 1 View    Narrative    EXAM: XR CHEST PORT 1 VIEW  LOCATION: [facility]  DATE/TIME: 2021 4:24 AM    INDICATION: Difficulty breathing and chest pain.  COMPARISON: 2020.      Impression    IMPRESSION: Patchy bilateral perihilar infiltrates consistent with infectious/inflammatory pneumonitis. No pleural fluid or pneumothorax. Normal heart  size and pulmonary vascularity.         Pending Results   None  Code Status   Full Code       Primary Care Physician   Maribel James        Discharge Disposition   Discharged to home  Condition at discharge: Stable    Consultations This Hospital Stay   ADVANCE DIRECTIVE IP CONSULT    Time Spent on this Encounter   Clinton BOLAND MD, MD, personally saw the patient today and spent greater than 30 minutes discharging this patient.    Discharge Orders      COVID-19 GetWell Loop Referral      Reason for your hospital stay    Covid pneumonia  Hypoxia     Contact provider    Contact your primary care provider if If increased trouble breathing, new arm/leg swelling, dizziness/passing out, falls, bleeding that doesn't stop, or uncontrolled pain.     Follow-up and recommended labs and tests     Follow up with primary care provider, Maribel James, within 7 days     Discharge - Quarantine/Isolation Instruction    Date of symptom onset:     Date of first positive test:    If you tested positive COVID-19 and show symptoms (fever, cough, body aches or trouble breathing):        Stay home and away from others (self-isolate) until:        At least 10 days have passed since your symptoms started. AND...        You've had no fever-and no medicine that reduces fever for 1 full day (24 hours). AND...         Your other symptoms have resolved (gotten better).  If you tested positive for COVID-19 but don't show symptoms:       Stay home and away from others (self-isolate) until at least 10 days have passed since the date of your first positive COVID-19 test.     Activity    Your activity upon discharge: activity as tolerated     Diet    Follow this diet upon discharge: Orders Placed This Encounter      Combination Diet Regular Diet Adult     Discharge Medications   Discharge Medication List as of 5/16/2021 11:22 AM      START taking these medications    Details   apixaban ANTICOAGULANT (ELIQUIS) 2.5 MG tablet Take 1 tablet  (2.5 mg) by mouth 2 times daily, Disp-60 tablet, R-0, E-Prescribe      dexamethasone (DECADRON) 6 MG tablet Take 1 tablet (6 mg) by mouth daily for 7 days, Disp-7 tablet, R-0, E-Prescribe         CONTINUE these medications which have CHANGED    Details   albuterol (PROAIR HFA) 108 (90 Base) MCG/ACT inhaler Inhale 2 puffs into the lungs every 6 hours as needed for shortness of breath / dyspnea, Disp-8 g, R-0, E-PrescribePharmacy may dispense brand covered by insurance (Proair, or proventil or ventolin or generic albuterol inhaler)      albuterol (PROVENTIL) (2.5 MG/3ML) 0.083% neb solution Take 1 vial (2.5 mg) by nebulization every 4 hours as needed for shortness of breath / dyspnea, Disp-3 mL, R-0, E-Prescribe         CONTINUE these medications which have NOT CHANGED    Details   Aspirin-Acetaminophen-Caffeine (EXCEDRIN MIGRAINE PO) Take 2 tablets by mouth every 6 hours as needed , Historical      cabergoline (DOSTINEX) 0.5 MG tablet Take 0.5 mg by mouth once a week TK 1 T PO Q WK, Historical             Allergies   No Known Allergies  Data   Most Recent 3 CBC's:  Recent Labs   Lab Test 05/16/21  0728 05/15/21  0841 05/14/21  0354   WBC 3.6* 3.7* 4.0   HGB 11.7 11.3* 12.6   MCV 84 84 83    196 207      Most Recent 3 BMP's:  Recent Labs   Lab Test 05/16/21  0728 05/15/21  0841 05/14/21  0354    141 138   POTASSIUM 3.6 3.6 4.0   CHLORIDE 112* 110* 107   CO2 26 24 28   BUN 12 13 6*   CR 0.86 0.92 1.08*   ANIONGAP 4 7 3   BOBBY 8.0* 8.1* 8.2*   GLC 86 132* 106*     Most Recent 2 LFT's:  Recent Labs   Lab Test 05/14/21  0354 11/02/17  2200   AST 32 19   ALT 30 25   ALKPHOS 84 90   BILITOTAL 0.5 0.2     Most Recent INR's and Anticoagulation Dosing History:  Anticoagulation Dose History     There is no flowsheet data to display.        Most Recent 3 Troponin's:  Recent Labs   Lab Test 05/14/21  0354 11/02/17  2200   TROPI <0.015 <0.015     Most Recent Cholesterol Panel:No lab results found.  Most Recent 6  Bacteria Isolates From Any Culture (See EPIC Reports for Culture Details):  Recent Labs   Lab Test 07/25/13  1541   CULT 10,000 to 50,000 colonies/mL Mixed gram positive gerardo Multiple species present,  probable perineal contamination.     Most Recent TSH, T4 and A1c Labs:No lab results found.

## 2021-05-27 ENCOUNTER — OFFICE VISIT (OUTPATIENT)
Dept: FAMILY MEDICINE | Facility: CLINIC | Age: 46
End: 2021-05-27

## 2021-05-27 VITALS
TEMPERATURE: 97.8 F | BODY MASS INDEX: 42 KG/M2 | OXYGEN SATURATION: 98 % | DIASTOLIC BLOOD PRESSURE: 88 MMHG | HEIGHT: 64 IN | WEIGHT: 246 LBS | HEART RATE: 90 BPM | SYSTOLIC BLOOD PRESSURE: 136 MMHG | RESPIRATION RATE: 28 BRPM

## 2021-05-27 DIAGNOSIS — J45.901 MODERATE ASTHMA WITH ACUTE EXACERBATION, UNSPECIFIED WHETHER PERSISTENT: ICD-10-CM

## 2021-05-27 DIAGNOSIS — Z02.9 ADMINISTRATIVE ENCOUNTER: ICD-10-CM

## 2021-05-27 DIAGNOSIS — R09.02 HYPOXIA: ICD-10-CM

## 2021-05-27 DIAGNOSIS — U07.1 PNEUMONIA DUE TO 2019 NOVEL CORONAVIRUS: Primary | ICD-10-CM

## 2021-05-27 DIAGNOSIS — J12.82 PNEUMONIA DUE TO 2019 NOVEL CORONAVIRUS: Primary | ICD-10-CM

## 2021-05-27 PROCEDURE — 99215 OFFICE O/P EST HI 40 MIN: CPT | Performed by: FAMILY MEDICINE

## 2021-05-27 ASSESSMENT — MIFFLIN-ST. JEOR: SCORE: 1750.85

## 2021-05-27 NOTE — NURSING NOTE
Chief Complaint   Patient presents with     Hospital F/U     [facility], pnuemonia due to COVID, patient still feeling tired and still has body aches     Forms     FMLA forms for COVID     Pre-visit Screening:  Immunizations:  up to date  Colonoscopy:  NA  Mammogram: NA  Asthma Action Test/Plan:  ACT  PHQ9:  NA  GAD7:  NA  Questioned patient about current smoking habits Pt. quit smoking some time ago.  Ok to leave detailed message on voice mail for today's visit only Yes, phone # [phone]

## 2021-05-27 NOTE — PROGRESS NOTES
"    Assessment & Plan     (U07.1,  J12.82) Pneumonia due to 2019 novel coronavirus  (primary encounter diagnosis)  Plan: I have reviewed the patient's medical history in detail and updated the computerized patient record.  Symptomatic care with decongestants, fluids, tylenol/Advil prn.   In addition, I have suggested that the patient be expecting slow gradual resolution of viral URI as the natural course.    Recheck CXR around 6/14/2021  (Z02.9) Administrative encounter  Plan: see FMLA forms    (R09.02) Hypoxia  Comment: resolved  Plan: I have reviewed the patient's medical history in detail and updated the computerized patient record.      (J45.901) Moderate asthma with acute exacerbation, unspecified whether persistent  Plan: follow AAP      Review of prior external note(s) from [facility]'s  40 minutes spent on the date of the encounter doing chart review, history and exam, documentation and further activities per the note       BMI:   Estimated body mass index is 42.23 kg/m  as calculated from the following:    Height as of this encounter: 1.626 m (5' 4\").    Weight as of this encounter: 111.6 kg (246 lb).   Weight management plan: Discussed healthy diet and exercise guidelines    FUTURE APPOINTMENTS:       - Follow-up visit in around 6/14/2021  SELF MONITORING:       - breathing/asthma care    See Patient Instructions        Maribel James MD  [facility]    Hiren Colón is a 45 year old who presents for the following health issues     HPI Hospitalization for Covid pneumonia with hypoxia/ discharged 5/16/2021.  Slowly doing better but needs help with self care, meals and medications    Hospital Follow-up Visit:    Hospital/Nursing Home/IP Rehab Facility: [facility]  Date of Admission: 5/14/2021  Date of Discharge: 5/16/2021  Reason(s) for Admission: Pneumonia Covid 19/ hypoxia      Was your hospitalization related to COVID-19? YES   How are you feeling today? Better  In the " "past 24 hours have you had shortness of breath when speaking, walking, or climbing stairs? My breathing issues have improved  Do you have a cough? Yes, I have a cough but it's not worse  When is the last time you had a fever greater than 100? no  Are you having any other symptoms? Loss of appetite, Loss of sense of taste or smell, Fatigue, Pain or pressure in chest, Body aches, Emotional distress and soft stools with some constipation/ no BM 4 days   Do you have any other stressors you would like to discuss with your provider? Job Concerns, Financial Concerns and OTHER:  nicholas off to take care of her       PHQ Assesment Total Score(s) 12/19/2018   PHQ-2 Score 0   Some recent data might be hidden       No flowsheet data found.    Was the patient in the ICU or did the patient experience delirium during hospitalization?  No    Problems taking medications regularly:  None  Medication changes since discharge: None  Problems adhering to non-medication therapy:  None    Summary of hospitalization:  [facility] discharge summary reviewed  Diagnostic Tests/Treatments reviewed.  Follow up needed: CXR 6/14/2021  Other Healthcare Providers Involved in Patient s Care:         None  Update since discharge: improved. Post Discharge Medication Reconciliation: discharge medications reconciled, continue medications without change.  Plan of care communicated with patient and caregiver          New Patient/Transfer of Care/last visit 12/2018  Use albuterol as needed/ flares with colds and allergies    Review of Systems   Constitutional, HEENT, cardiovascular, pulmonary, gi and gu systems are negative, except as otherwise noted.      Objective    /88 (BP Location: Left arm, Patient Position: Sitting, Cuff Size: Adult Large)   Pulse 90   Temp 97.8  F (36.6  C) (Temporal)   Resp 28   Ht 1.626 m (5' 4\")   Wt 111.6 kg (246 lb)   SpO2 98%   BMI 42.23 kg/m    Body mass index is 42.23 kg/m .  Physical Exam   GENERAL: " healthy, alert and no distress  NECK: no adenopathy, no asymmetry, masses, or scars and thyroid normal to palpation  RESP: lungs clear to auscultation - no rales, rhonchi or wheezes  CV: regular rate and rhythm, normal S1 S2, no S3 or S4, no murmur, click or rub, no peripheral edema and peripheral pulses strong  ABDOMEN: soft, nontender, no hepatosplenomegaly, no masses and bowel sounds normal  MS: no gross musculoskeletal defects noted, no edema    CXR - Reviewed     Total time spent with patient today including visit and non face to face time 40 minutes.

## 2021-05-27 NOTE — PATIENT INSTRUCTIONS
Continue to rest. Eat well and drink fluids    Try walking daily  Follow your astham action plan    Symptomatic care with decongestants, fluids, tylenol/advil prn.  Expecting slow gradual resolution of viral URI as the natural course.    Return to clinic around 6/14/2021 for a repeat CXR, lab work and a return to work plan

## 2021-06-08 ENCOUNTER — TELEPHONE (OUTPATIENT)
Dept: FAMILY MEDICINE | Facility: CLINIC | Age: 46
End: 2021-06-08

## 2021-06-15 ENCOUNTER — OFFICE VISIT (OUTPATIENT)
Dept: FAMILY MEDICINE | Facility: CLINIC | Age: 46
End: 2021-06-15

## 2021-06-15 VITALS
HEIGHT: 64 IN | BODY MASS INDEX: 41.48 KG/M2 | DIASTOLIC BLOOD PRESSURE: 90 MMHG | HEART RATE: 68 BPM | WEIGHT: 243 LBS | SYSTOLIC BLOOD PRESSURE: 134 MMHG | TEMPERATURE: 97.9 F | RESPIRATION RATE: 26 BRPM | OXYGEN SATURATION: 95 %

## 2021-06-15 DIAGNOSIS — G43.C0 PERIODIC HEADACHE SYNDROME, NOT INTRACTABLE: ICD-10-CM

## 2021-06-15 DIAGNOSIS — U07.1 PNEUMONIA DUE TO 2019 NOVEL CORONAVIRUS: Primary | ICD-10-CM

## 2021-06-15 DIAGNOSIS — J45.901 MODERATE ASTHMA WITH ACUTE EXACERBATION, UNSPECIFIED WHETHER PERSISTENT: ICD-10-CM

## 2021-06-15 DIAGNOSIS — G93.31 POSTVIRAL FATIGUE SYNDROME: ICD-10-CM

## 2021-06-15 DIAGNOSIS — Z02.9 ADMINISTRATIVE ENCOUNTER: ICD-10-CM

## 2021-06-15 DIAGNOSIS — E22.1 HYPERPROLACTINEMIA (H): ICD-10-CM

## 2021-06-15 DIAGNOSIS — J12.82 PNEUMONIA DUE TO 2019 NOVEL CORONAVIRUS: Primary | ICD-10-CM

## 2021-06-15 PROBLEM — R09.02 HYPOXIA: Status: RESOLVED | Noted: 2021-05-14 | Resolved: 2021-06-15

## 2021-06-15 PROCEDURE — 71046 X-RAY EXAM CHEST 2 VIEWS: CPT | Performed by: FAMILY MEDICINE

## 2021-06-15 PROCEDURE — 99215 OFFICE O/P EST HI 40 MIN: CPT | Performed by: FAMILY MEDICINE

## 2021-06-15 RX ORDER — SUMATRIPTAN 25 MG/1
25 TABLET, FILM COATED ORAL
Qty: 8 TABLET | Refills: 1 | Status: SHIPPED | OUTPATIENT
Start: 2021-06-15 | End: 2022-04-20

## 2021-06-15 ASSESSMENT — MIFFLIN-ST. JEOR: SCORE: 1729.3

## 2021-06-15 NOTE — PROGRESS NOTES
SUBJECTIVE: 45 year old female complaining of much improved breathing using her nebulizer once in the afternoon due to humidity and heat/ continued fatigue for 1 month(s).   The patient describes trying to go to work yesterday and got a migraine/ supervisor asked her to take more time off/ due for CXR today. Lower legs ache, sleeps a lot, mild constipation. Has struggled with tension type migraine headaches in the past responsive to IMITREX. Has been using Excedrin migraine and requests a refill of IMITREX as well/ reviewed in [facility] notes.    The patient denies a history of fever, Gi complaints or rash.   Smoking history: No.   Relevant past medical history: positive for asthma persistent, obesity. Hyperprolactinemia/ has not followed with Endocrinology and stopped medications    OBJECTIVE: The patient appears healthy, alert, no distress, cooperative, smiling, over weight and fatigued.   EARS: negative  NOSE/SINUS: Nares normal. Septum midline. Mucosa normal. No drainage or sinus tenderness.   THROAT: normal   NECK:Neck supple. No adenopathy. Thyroid symmetric, normal size,, Carotids without bruits.   CHEST: Regular rate and  rhythm. S1 and S2 normal, no murmurs, clicks, gallops or rubs. No edema or JVD. Chest is clear; no wheezes or rales.  ABD: The abdomen is soft without tenderness, guarding, mass or organomegaly. Bowel sounds are normal. No CVA tenderness or inguinal adenopathy noted.  Obese  Ext: The lower extremities are normal and reveal no sign of DVT. Calves and thighs are soft and non tender, color is normal, no swelling or redness. Niya's sign is negative.  Pedal pulses are normal.  Skin: Skin color, temperature, and turgor are normal. No rashes or suspicious skin lesions noted.    CXR: negative    ASSESSMENT: (U07.1,  J12.82) Pneumonia due to 2019 novel coronavirus  (primary encounter diagnosis)  Comment: await radiology reading  Plan: XR Chest 2 Views, CANCELED: XR Chest 2 Views         Symptomatic care with decongestants, fluids, tylenol/advil prn. Use GUAIFENESIN  MG OR TBCR, 1 tab po BID (Twice per day), D: 20, R: 0 for congestion and cough.  Discussion of a 2 week rest and recovery plan including step wise walking plan for recovery and asthma action plan    Headache prevention  Food changes  Schedule of rest and activity        (J45.901) Moderate asthma with acute exacerbation, unspecified whether persistent  Comment: follow AAP  Plan: XR Chest 2 Views, CANCELED: XR Chest 2 Views            (G93.3) Postviral fatigue syndrome  Plan: as above    (G43.C0) Periodic headache syndrome, not intractable  Comment: start headache diary  Plan: SUMAtriptan (IMITREX) 25 MG tablet        Potential medication side effects were discussed with the patient; let me know if any occur.      (Z02.9) Administrative encounter  Comment: see new FMLA form  Plan: 2 more weeks for recovery time    (E22.1) Hyperprolactinemia (H)  Comment: encouraged follow up with specialists  Plan: Endocrinology      Total time spent with patient today including visit and non face to face time 40 minutes.

## 2021-06-15 NOTE — PATIENT INSTRUCTIONS
Await radiology report    Discussion of a 2 week rest and recovery plan including step wise walking plan for recovery and asthma action plan    Headache prevention  Food changes  Schedule of rest and activity    Recheck 2 weeks prn  See letter for that time off in Epic

## 2021-06-15 NOTE — NURSING NOTE
Chief Complaint   Patient presents with     Follow Up     follow up on COVID 19 and pnuemonia, patient feeling better but still very fatigued and headaches are happening everyday, wants to start taking imitrex for this, needs referral for post covid clinic     Pre-visit Screening:  Immunizations:  up to date  Colonoscopy:  NA  Mammogram: NA  Asthma Action Test/Plan:  ACT given today   PHQ9:  PHQ-2 done today   GAD7:  No concerns  Questioned patient about current smoking habits Pt. quit smoking some time ago.  Ok to leave detailed message on voice mail for today's visit only Yes , phone # [phone]

## 2021-06-16 ASSESSMENT — ASTHMA QUESTIONNAIRES: ACT_TOTALSCORE: 14

## 2021-06-17 ENCOUNTER — TELEPHONE (OUTPATIENT)
Dept: FAMILY MEDICINE | Facility: CLINIC | Age: 46
End: 2021-06-17

## 2021-06-17 NOTE — TELEPHONE ENCOUNTER
I have tried to fax the forms to 3 fax numbers and since pt was here in clinic. The fax never goes through. I will mail the forms.

## 2021-06-23 ENCOUNTER — OFFICE VISIT (OUTPATIENT)
Dept: FAMILY MEDICINE | Facility: CLINIC | Age: 46
End: 2021-06-23

## 2021-06-23 VITALS
WEIGHT: 243.8 LBS | DIASTOLIC BLOOD PRESSURE: 72 MMHG | RESPIRATION RATE: 20 BRPM | TEMPERATURE: 98.1 F | HEART RATE: 68 BPM | SYSTOLIC BLOOD PRESSURE: 124 MMHG | BODY MASS INDEX: 41.62 KG/M2 | HEIGHT: 64 IN

## 2021-06-23 DIAGNOSIS — M79.672 LEFT FOOT PAIN: Primary | ICD-10-CM

## 2021-06-23 PROCEDURE — 73630 X-RAY EXAM OF FOOT: CPT | Performed by: FAMILY MEDICINE

## 2021-06-23 PROCEDURE — 99213 OFFICE O/P EST LOW 20 MIN: CPT | Performed by: FAMILY MEDICINE

## 2021-06-23 ASSESSMENT — MIFFLIN-ST. JEOR: SCORE: 1732.93

## 2021-06-23 NOTE — PROGRESS NOTES
"SUBJECTIVE:  Katarzyna Brito, a 45 year old female scheduled an appointment to discuss the following issues:  Left foot pain         PT developed acute pain in left dorsal midfoot yesterday. NO swelling  NKI    She is worried about blood clot    No calf pain    Medical, social, surgical, and family histories reviewed.    ROS:  CONSTITUTIONAL: NEGATIVE for fever, chills  RESP: NEGATIVE for significant cough or SOB  CV: NEGATIVE for chest pain, palpitations   NEURO: NEGATIVE for weakness, dizziness or paresthesias or headache    OBJECTIVE:  /72 (BP Location: Left arm, Patient Position: Chair, Cuff Size: Adult Large)   Pulse 68   Temp 98.1  F (36.7  C)   Resp 20   Ht 1.613 m (5' 3.5\")   Wt 110.6 kg (243 lb 12.8 oz)   BMI 42.51 kg/m    EXAM:  GENERAL APPEARANCE: healthy, alert and no distress  MS: left foot with moderate TTP dorsal midfoot, not 1st MTP, slight erythema but very faint.  No sores or ulcers    No calf TTP, no palpable cords, neg Homans  SKIN: no suspicious lesions or rashes    Xray neg,   Will await radiology over-read and contact patient if any discrepancies     Uric acid-pending-pt left prior to blood draw as I thought they had already drawn, called to come for lab only    ASSESSMENT/PLAN:  (A80.372) Left foot pain  (primary encounter diagnosis)  Comment: suspect strain vs gout-no signs clot, if uric acid high likely gout  Plan: X-ray lt Foot G/E 3 vws*        restm nsaids prn, f/u if not improving or worsening      "

## 2021-06-23 NOTE — NURSING NOTE
Katarzyna Brito is here for left foot pain for the past day. Not sure if it is a blood clot as this runs in her family.    Questioned patient about current smoking habits.  Pt. quit smoking some time ago.  PULSE regular  My Chart:   CLASSIFICATION OF OVERWEIGHT AND OBESITY BY BMI                        Obesity Class           BMI(kg/m2)  Underweight                                    < 18.5  Normal                                         18.5-24.9  Overweight                                     25.0-29.9  OBESITY                     I                  30.0-34.9                             II                 35.0-39.9  EXTREME OBESITY             III                >40                            Patient's  BMI Body mass index is 42.51 kg/m .  http://hin.nhlbi.nih.gov/menuplanner/menu.cgi  Pre-visit planning  Immunizations - up to date  Colonoscopy -   Mammogram -   Asthma -   PHQ9 -    ELEONORA-7 -

## 2021-06-24 ENCOUNTER — TELEPHONE (OUTPATIENT)
Dept: FAMILY MEDICINE | Facility: CLINIC | Age: 46
End: 2021-06-24

## 2021-06-24 DIAGNOSIS — M79.672 LEFT FOOT PAIN: ICD-10-CM

## 2021-06-24 PROCEDURE — 36415 COLL VENOUS BLD VENIPUNCTURE: CPT | Performed by: FAMILY MEDICINE

## 2021-06-24 PROCEDURE — 84550 ASSAY OF BLOOD/URIC ACID: CPT | Mod: 90 | Performed by: FAMILY MEDICINE

## 2021-06-24 NOTE — LETTER
June 28, 2021      Katarzyna Brito  [address] [place] [address]        Dear ,    We are writing to inform you of your test results.    Your test results fall within the expected range(s) or remain unchanged from previous results.  Please continue with current treatment plan. Normal uric acid makes gout unlikely. Let me know if symptoms persist.     Resulted Orders   URIC ACID ([facility])   Result Value Ref Range    Uric Acid 5.8 2.5 - 7.0 mg/dL      Comment:      Therapeutic target for gout patients: <6.0 mg/dL             If you have any questions or concerns, please call the clinic at the number listed above.       Sincerely,      Maximus aLrry MD

## 2021-06-25 LAB — URATE SERPL-MCNC: 5.8 MG/DL (ref 2.5–7)

## 2021-06-29 ENCOUNTER — OFFICE VISIT (OUTPATIENT)
Dept: FAMILY MEDICINE | Facility: CLINIC | Age: 46
End: 2021-06-29

## 2021-06-29 VITALS
SYSTOLIC BLOOD PRESSURE: 118 MMHG | DIASTOLIC BLOOD PRESSURE: 92 MMHG | WEIGHT: 244 LBS | OXYGEN SATURATION: 99 % | HEIGHT: 64 IN | RESPIRATION RATE: 20 BRPM | BODY MASS INDEX: 41.66 KG/M2 | HEART RATE: 70 BPM | TEMPERATURE: 97.8 F

## 2021-06-29 DIAGNOSIS — J45.901 MODERATE ASTHMA WITH ACUTE EXACERBATION, UNSPECIFIED WHETHER PERSISTENT: ICD-10-CM

## 2021-06-29 DIAGNOSIS — J12.82 PNEUMONIA DUE TO 2019 NOVEL CORONAVIRUS: ICD-10-CM

## 2021-06-29 DIAGNOSIS — Z02.9 ADMINISTRATIVE ENCOUNTER: ICD-10-CM

## 2021-06-29 DIAGNOSIS — G93.31 POSTVIRAL FATIGUE SYNDROME: Primary | ICD-10-CM

## 2021-06-29 DIAGNOSIS — E66.01 MORBID OBESITY (H): ICD-10-CM

## 2021-06-29 DIAGNOSIS — M79.672 LEFT FOOT PAIN: ICD-10-CM

## 2021-06-29 DIAGNOSIS — U07.1 PNEUMONIA DUE TO 2019 NOVEL CORONAVIRUS: ICD-10-CM

## 2021-06-29 DIAGNOSIS — M70.61 TROCHANTERIC BURSITIS OF RIGHT HIP: ICD-10-CM

## 2021-06-29 LAB
% GRANULOCYTES: 42.9 %
ALBUMIN SERPL-MCNC: 3.8 G/DL (ref 3.6–5.1)
ALBUMIN/GLOB SERPL: 1.5 {RATIO} (ref 1–2.5)
ALP SERPL-CCNC: 73 U/L (ref 33–130)
ALT 1742-6: 16 U/L (ref 0–32)
AST 1920-8: 8 U/L (ref 0–35)
BILIRUB SERPL-MCNC: 0.3 MG/DL (ref 0.2–1.2)
BUN SERPL-MCNC: 8 MG/DL (ref 7–25)
BUN/CREATININE RATIO: 10.7 (ref 6–22)
CALCIUM SERPL-MCNC: 9.1 MG/DL (ref 8.6–10.3)
CHLORIDE SERPLBLD-SCNC: 105.6 MMOL/L (ref 98–110)
CO2 SERPL-SCNC: 31.3 MMOL/L (ref 20–32)
CREAT SERPL-MCNC: 0.75 MG/DL (ref 0.6–1.3)
GLOBULIN, CALCULATED - QUEST: 2.6 (ref 1.9–3.7)
GLUCOSE SERPL-MCNC: 87 MG/DL (ref 60–99)
HCT VFR BLD AUTO: 36.6 % (ref 35–47)
HEMOGLOBIN: 11.5 G/DL (ref 11.7–15.7)
LYMPHOCYTES NFR BLD AUTO: 47.3 %
MCH RBC QN AUTO: 26 PG (ref 26–33)
MCHC RBC AUTO-ENTMCNC: 31.4 G/DL (ref 31–36)
MCV RBC AUTO: 82.8 FL (ref 78–100)
MONOCYTES NFR BLD AUTO: 9.8 %
PLATELET COUNT - QUEST: 320 10^9/L (ref 150–375)
POTASSIUM SERPL-SCNC: 3.57 MMOL/L (ref 3.5–5.3)
PROT SERPL-MCNC: 6.4 G/DL (ref 6.1–8.1)
RBC # BLD AUTO: 4.42 10*12/L (ref 3.8–5.2)
SODIUM SERPL-SCNC: 142.2 MMOL/L (ref 135–146)
WBC # BLD AUTO: 6.5 10*9/L (ref 4–11)

## 2021-06-29 PROCEDURE — 80053 COMPREHEN METABOLIC PANEL: CPT | Performed by: FAMILY MEDICINE

## 2021-06-29 PROCEDURE — 85025 COMPLETE CBC W/AUTO DIFF WBC: CPT | Performed by: FAMILY MEDICINE

## 2021-06-29 PROCEDURE — 36415 COLL VENOUS BLD VENIPUNCTURE: CPT | Performed by: FAMILY MEDICINE

## 2021-06-29 PROCEDURE — 93000 ELECTROCARDIOGRAM COMPLETE: CPT | Performed by: FAMILY MEDICINE

## 2021-06-29 PROCEDURE — 99215 OFFICE O/P EST HI 40 MIN: CPT | Mod: 25 | Performed by: FAMILY MEDICINE

## 2021-06-29 ASSESSMENT — MIFFLIN-ST. JEOR: SCORE: 1733.84

## 2021-06-29 NOTE — NURSING NOTE
Chief Complaint   Patient presents with     Follow Up     follow up on FMLA, went back to work yesterday and ended up falling asleep at her desk, wondering if there is something more going on with her body     Pre-visit Screening:  Immunizations:  up to date  Colonoscopy:  NA  Mammogram: NA  Asthma Action Test/Plan:  NA  PHQ9:  AN  GAD7:  NA  Questioned patient about current smoking habits Pt. quit smoking some time ago.  Ok to leave detailed message on voice mail for today's visit only Yes, phone # [phone]

## 2021-06-29 NOTE — PROGRESS NOTES
SUBJECTIVE: 45 year old female complaining of falling asleep at her desk yesterday when returning to work/ see post viral symptom after CoVid previous 1 month of FMLA/ has an appointment at post Covid clinic in 8/2021. She struggles to work over 4 hours even at home this week. Is able to do activities in short intervals well. Her supervisor asked her to go home and consider a gradual back to work schedule  for 1 month(s).   The patient describes feeling better daily except with her walking program having a left outer hip pain and last week a right forefoot discomfort without injury.   The patient denies a history of SOB, cough, asthma flare, GI complaints or rash.   Smoking history: No.   Relevant past medical history: positive for morbid obesity with poor stamina before coronavirus pneumonia and asthma.    OBJECTIVE: The patient appears healthy, alert, no distress, cooperative, over weight and fatigued.   EARS: negative  NOSE/SINUS: Nares normal. Septum midline. Mucosa normal. No drainage or sinus tenderness.   THROAT: normal   NECK:Neck supple. No adenopathy. Thyroid symmetric, normal size,, Carotids without bruits.   CHEST: Regular rate and  rhythm. S1 and S2 normal, no murmurs, clicks, gallops or rubs. No edema or JVD. Chest is clear; no wheezes or rales.  ABD: The abdomen is soft without tenderness, guarding, mass or organomegaly. Bowel sounds are normal. No CVA tenderness or inguinal adenopathy noted.  Obese  EXT: Right palpable pain over the trochanteric bursa. Normal hip rotation. The lower extremities are normal and reveal no sign of DVT. Calves and thighs are soft and non tender, color is normal, no swelling or redness. Niya's sign is negative.  Pedal pulses are normal. Right foot distal 3rd and 4th metatarsal tenderness without deformity.    Reviewed 6/23/2021 visit and imaging/labs    ASSESSMENT: (G93.3) Postviral fatigue syndrome  (primary encounter diagnosis)  Plan: EKG 12-lead complete w/read -  Clinics, HEMOGRAM        PLATELET DIFF ([facility]), VENOUS COLLECTION,         Comprehensive Metobolic Panel ([facility])        Continue symptomatic care and working on gradual more activities/ good shoes    (U07.1,  J12.82) Pneumonia due to 2019 novel coronavirus  Plan: repeat CXR reviewed/ resolved    (J45.901) Moderate asthma with acute exacerbation, unspecified whether persistent  Plan: well controlled    (Z02.9) Administrative encounter  Comment: see FMLA  Plan: gradual return to work starting with 4 hours daily for 2 weeks, then 6 hours daily for 2 weeks, then full time/ encouraged remote working when possible  See new forms    (E66.01) Morbid obesity (H)  Plan: A low fat diet, regular aerobic exercise like walking 30 minutes daily and weight control is the treatment recommendations at this time      (M70.61) Trochanteric bursitis of right hip  Comment: anatomy reviewed  Plan: ice. Tylenol. Rehab stretches/ exercises to begin immediately and given in writing with illustrations.      (M79.942) Left foot pain  Plan: Good shoe wear with support.    Total time spent with patient today including visit and non face to face time 40 minutes.'

## 2021-06-30 ENCOUNTER — TELEPHONE (OUTPATIENT)
Dept: FAMILY MEDICINE | Facility: CLINIC | Age: 46
End: 2021-06-30

## 2021-06-30 DIAGNOSIS — G93.31 POSTVIRAL FATIGUE SYNDROME: Primary | ICD-10-CM

## 2021-06-30 NOTE — TELEPHONE ENCOUNTER
Called with results. Her supervisor is on board with gradual return to work/ starting 4 hours/day and back to full time in 1 month.

## 2021-06-30 NOTE — PATIENT INSTRUCTIONS
Await labs    [facility] plan of gradual return to work with 4 hour shifts/ day for 2 weeks, etc  Discuss with supervisor    Symptomatic care with gradual walking program, rehab exercises, good shoes, fluids, tylenol/advil prn.     In addition, I have suggested that the patient   monitor for symptoms of bacterial infection expecting slow gradual resolution of viral URI as the natural course.

## 2021-07-14 ENCOUNTER — TELEPHONE (OUTPATIENT)
Dept: FAMILY MEDICINE | Facility: CLINIC | Age: 46
End: 2021-07-14

## 2021-07-14 DIAGNOSIS — Z02.9 ADMINISTRATIVE ENCOUNTER: Primary | ICD-10-CM

## 2021-07-14 NOTE — TELEPHONE ENCOUNTER
Patient called in stating that her job needs a letter from Dr. James stating what her back to work plan is. I called FMLA and asked exactly what they needed and they stated that they need a note from Dr. James stating that the patient was to work 2 weeks at 4 hours, 2 weeks and 6 hours and then back to full time. No other form is needed but they do need this in letter form. Routing to Dr. James for review, can you write this letter so that I can fax it to the patients job for [employer]?    Letter can be faxed to [phone]

## 2021-07-14 NOTE — LETTER
July 15, 2021      Katarzyna Brito  [address] [facility] [address]        To Whom It May Concern:    Katarzyna Brito  was seen on on 5/14/2021 with Covid -19 pneumonia and asthma exacerbation. She has been recovering at home with post viral fatigue.    On 6/29/2021 we began a return to work plan of 4 hours per shift for 2 weeks, than 6 hours per shift for 2 weeks and than back to 8 hour days. Our expectation that her symptoms will resolve with time and rest.      Please excuse her as discussed in the FMLA papers I filled out due to illness.        Sincerely,        Maribel James MD

## 2021-07-29 ENCOUNTER — OFFICE VISIT (OUTPATIENT)
Dept: FAMILY MEDICINE | Facility: CLINIC | Age: 46
End: 2021-07-29

## 2021-07-29 VITALS
WEIGHT: 243.2 LBS | TEMPERATURE: 97.4 F | OXYGEN SATURATION: 97 % | DIASTOLIC BLOOD PRESSURE: 80 MMHG | HEART RATE: 79 BPM | RESPIRATION RATE: 20 BRPM | BODY MASS INDEX: 41.52 KG/M2 | SYSTOLIC BLOOD PRESSURE: 110 MMHG | HEIGHT: 64 IN

## 2021-07-29 DIAGNOSIS — F43.21 SITUATIONAL DEPRESSION: ICD-10-CM

## 2021-07-29 DIAGNOSIS — G93.31 POSTVIRAL FATIGUE SYNDROME: Primary | ICD-10-CM

## 2021-07-29 DIAGNOSIS — E66.01 MORBID OBESITY (H): ICD-10-CM

## 2021-07-29 PROCEDURE — 99214 OFFICE O/P EST MOD 30 MIN: CPT | Performed by: FAMILY MEDICINE

## 2021-07-29 ASSESSMENT — MIFFLIN-ST. JEOR: SCORE: 1730.21

## 2021-07-29 NOTE — PATIENT INSTRUCTIONS
Postviral fatigue syndrome  (primary encounter diagnosis)  Comment: continue 4-5 hour shift days at work/ see letter  Plan: She will bring back paper work for FMLA continuation/ follow up with post CoVid clinic 8/2021

## 2021-07-29 NOTE — PROGRESS NOTES
SUBJECTIVE: 45 year old female complaining of fatigue after coronavirus documented pneumonia in 5 /2021. She has been off work and now back to limited shifts  for 1 month(s).   The patient describes able to work 5 hours a day but has no more to give. Seeing a therapists. Admits to depression over her circumstances and financial concerns.  Post Covid clinic scheduled end of August.  The patient denies a history of cough, URI symptoms, fever, loss of taste or smell or asthma issues.   Smoking history: No.   Relevant past medical history: positive for morbid obesity.    OBJECTIVE: The patient appears healthy, alert, no distress, cooperative, smiling and over weight.   EARS: negative  NOSE/SINUS: Nares normal. Septum midline. Mucosa normal. No drainage or sinus tenderness.   THROAT: normal   NECK:Neck supple. No adenopathy. Thyroid symmetric, normal size,, Carotids without bruits.   CHEST: Regular rate and  rhythm. S1 and S2 normal, no murmurs, clicks, gallops or rubs. No edema or JVD. Chest is clear; no wheezes or rales.    All imaging and labs reviewed.    ASSESSMENT: (G93.3) Postviral fatigue syndrome  (primary encounter diagnosis)  Comment: continue 4-5 hour shift days at work/ see letter  Plan: She will bring back paper work for FMLA continuation/ follow up with post CoVid clinic 8/2021    (F43.21) Situational depression  Plan: Support.    Total time spent with patient today including visit and non face to face time 30 minutes.

## 2021-07-29 NOTE — LETTER
July 29, 2021      Katarzyna Brito  [address] [facility] [address]        To Whom It May Concern:     Katarzyna Brito  was seen on on 5/14/2021 with Covid -19 pneumonia and asthma exacerbation. She has been recovering at home with post viral fatigue.    She has started a work conditioning program but has gotten to 5 hours per day but markedly fatigued at the end of the day. She has a Post- Covid evaluation the end of August 2021.     Please limit her shifts to 5 hours/day  until 8/30/2021 due to illness.        Sincerely,        Maribel James MD

## 2021-08-17 ENCOUNTER — TELEPHONE (OUTPATIENT)
Dept: FAMILY MEDICINE | Facility: CLINIC | Age: 46
End: 2021-08-17

## 2021-10-21 ENCOUNTER — OFFICE VISIT (OUTPATIENT)
Dept: FAMILY MEDICINE | Facility: CLINIC | Age: 46
End: 2021-10-21

## 2021-10-21 VITALS
DIASTOLIC BLOOD PRESSURE: 80 MMHG | HEART RATE: 83 BPM | HEIGHT: 64 IN | SYSTOLIC BLOOD PRESSURE: 118 MMHG | BODY MASS INDEX: 42.68 KG/M2 | WEIGHT: 250 LBS | OXYGEN SATURATION: 99 % | TEMPERATURE: 97.2 F

## 2021-10-21 DIAGNOSIS — J20.9 ACUTE BRONCHITIS, UNSPECIFIED ORGANISM: Primary | ICD-10-CM

## 2021-10-21 DIAGNOSIS — R05.9 COUGH: ICD-10-CM

## 2021-10-21 PROBLEM — J12.82 PNEUMONIA DUE TO 2019 NOVEL CORONAVIRUS: Status: RESOLVED | Noted: 2021-05-14 | Resolved: 2021-10-21

## 2021-10-21 PROBLEM — U07.1 PNEUMONIA DUE TO 2019 NOVEL CORONAVIRUS: Status: RESOLVED | Noted: 2021-05-14 | Resolved: 2021-10-21

## 2021-10-21 PROCEDURE — 71046 X-RAY EXAM CHEST 2 VIEWS: CPT | Performed by: PHYSICIAN ASSISTANT

## 2021-10-21 PROCEDURE — 99213 OFFICE O/P EST LOW 20 MIN: CPT | Performed by: PHYSICIAN ASSISTANT

## 2021-10-21 RX ORDER — BENZONATATE 100 MG/1
100 CAPSULE ORAL 3 TIMES DAILY PRN
Qty: 30 CAPSULE | Refills: 0 | Status: SHIPPED | OUTPATIENT
Start: 2021-10-21 | End: 2022-05-13

## 2021-10-21 RX ORDER — ALBUTEROL SULFATE 1.25 MG/3ML
1.25 SOLUTION RESPIRATORY (INHALATION) EVERY 6 HOURS PRN
COMMUNITY

## 2021-10-21 RX ORDER — AZITHROMYCIN 250 MG/1
TABLET, FILM COATED ORAL
Qty: 6 TABLET | Refills: 0 | Status: SHIPPED | OUTPATIENT
Start: 2021-10-21 | End: 2022-05-13

## 2021-10-21 RX ORDER — CODEINE PHOSPHATE AND GUAIFENESIN 10; 100 MG/5ML; MG/5ML
1-2 SOLUTION ORAL EVERY 4 HOURS PRN
Qty: 180 ML | Refills: 0 | Status: SHIPPED | OUTPATIENT
Start: 2021-10-21 | End: 2022-05-13

## 2021-10-21 ASSESSMENT — MIFFLIN-ST. JEOR: SCORE: 1756.05

## 2021-10-21 NOTE — PROGRESS NOTES
"CC: Cough    History:  Katarzyna is here today with cough that started 3 weeks ago. Cough produces this mucous, but it is stuck in throat- no coughing up or swallowing down. Has been taking fluids, Robitussin, Mucinex, lozenges, humidifier, steam, orange/conchita/garlic/onions, vitamin C, zinc. Sleep has been particularly difficult. Has not sleep upright. Pt denies any SOB, headache, change in smell/taste. Does have substantial history of pneumonia.     PMH, MEDICATIONS, ALLERGIES, SOCIAL AND FAMILY HISTORY in [facility] and reviewed by me personally.    ROS negative other than the symptoms noted above in the HPI.      Examination   /80 (BP Location: Left arm, Patient Position: Sitting, Cuff Size: Adult Large)   Pulse 83   Temp 97.2  F (36.2  C) (Temporal)   Ht 1.613 m (5' 3.5\")   Wt 113.4 kg (250 lb)   SpO2 99%   BMI 43.59 kg/m       Constitutional: Sitting comfortably, in no acute distress. Vital signs noted  Eyes: pupils equal round reactive to light and accomodation, extra ocular movements intact  Ears: external canals and TMs free of abnormalities  Nose: patent, without mucosal abnormalities  Mouth and throat: without erythema or lesions of the mucosa  Neck:  no adenopathy, trachea midline and normal to palpation, thyroid normal to palpation  Cardiovascular:  regular rate and rhythm, no murmurs, clicks, or gallops  Respiratory:  normal respiratory rate and rhythm, lungs clear to auscultation  SKIN: No jaundice/pallor/rash.   Psychiatric: mentation appears normal and affect normal/bright        A/P    ICD-10-CM    1. Cough  R05.9 XR Chest 2 Views   2. Acute bronchitis, unspecified organism  J20.9 azithromycin (ZITHROMAX) 250 MG tablet     benzonatate (TESSALON) 100 MG capsule     guaiFENesin-codeine (ROBITUSSIN AC) 100-10 MG/5ML solution       DISCUSSION:  CXR today appears normal. Will contact with radiology report if any abnormalities. Most consistent with acute bronchitis. Given duration of symptoms, may be " bacterial. Recommended pt complete a z-pack (azithromycin). Take with food. Warned of side effects including upset stomach, loose stool, rash, and to stop medication and contact me if side effects noted. Okay to continue using OTC cough suppressants as needed.    Tessalon Perles as needed for cough suppresion. Cautioned that this medication can cause drowsiness, so patient should first use this medication at a time where they will not need to do any driving or anything else that is dangerous while drowsy until they knows how their body will react.    I prescribed codeine solution to use for cough at night if tessalon not working well enough. Warned of side effects, including drowsiness, no driving while taking, and also warned of addictive potential and to use sparingly.    Contact me in 1 week if not significantly better, or sooner with worsening.     follow up visit: As needed    Sonia Cee PA-C  [facility]

## 2021-10-21 NOTE — NURSING NOTE
Chief Complaint   Patient presents with     Cough     cough for 3 weeks, 4 covid test during this time were negative. Would like to make sure she does not have pneumonia.         Pre-visit Screening:  Immunizations:  up to date  Colonoscopy:  NA  Mammogram: is due and to be scheduled by patient for later completion  Asthma Action Test/Plan:  NA  PHQ9:  NA  GAD7:  NA  Questioned patient about current smoking habits Pt. quit smoking some time ago.  Ok to leave detailed message on voice mail for today's visit only Yes, phone # [phone]

## 2022-04-20 DIAGNOSIS — G43.C0 PERIODIC HEADACHE SYNDROME, NOT INTRACTABLE: ICD-10-CM

## 2022-04-20 RX ORDER — SUMATRIPTAN 25 MG/1
25 TABLET, FILM COATED ORAL
Qty: 8 TABLET | Refills: 0 | Status: SHIPPED | OUTPATIENT
Start: 2022-04-20 | End: 2022-05-13

## 2022-04-20 NOTE — TELEPHONE ENCOUNTER
Refilled #8. Would ideally have appt to discuss further refills as this was previously prescribed by Dr. James.

## 2022-04-20 NOTE — TELEPHONE ENCOUNTER
LM for patient informing her medication refill was sent in but that a visit is needed for any further refills.

## 2022-04-20 NOTE — TELEPHONE ENCOUNTER
Patient called in and left a message on the CS line asking if oSnia is willing to send in a supply of  Pending Prescriptions:                       Disp   Refills    SUMAtriptan (IMITREX) 25 MG tablet        8 tabl*1            Sig: Take 1 tablet (25 mg) by mouth at onset of           headache for migraine May repeat in 2 hours. Max           8 tablets/24 hours.    She is feeling a migraine coming on today and is not able to make it in for an appointment with week due to conflicts. Routing to Sonia for review, are you willing to send this in for the patient or did you want her to be seen first?

## 2022-05-11 NOTE — PROGRESS NOTES
Assessment & Plan   Problem List Items Addressed This Visit        Nervous and Auditory    Migraine    Relevant Medications    SUMAtriptan (IMITREX) 25 MG tablet    Hyperprolactinemia (H) - Primary    Relevant Orders    Adult Endocrinology  Referral    PROLACTIN ([facility])      Other Visit Diagnoses     Pituitary tumor        Relevant Orders    Adult Endocrinology  Referral    Radiology Referral    PROLACTIN ([facility])    VENOUS COLLECTION (Completed)    MR Pituitary w/o & w Contrast    Visit for screening mammogram        Relevant Orders    Mammo Screening digital (bilat)    Radiology Referral    Lipid screening        Relevant Orders    Lipid Panel ([facility])    Screening for diabetes mellitus        Relevant Orders    Basic Metabolic Panel ([facility])    Need for hepatitis C screening test        Relevant Orders    HEPATITIS C ANTIBODY ([facility])    Screening for HIV (human immunodeficiency virus)        Relevant Orders    HIV 1/2 Agn Jemima 4th Gen w Reflex ([facility])           1. Hyperprolactinemia (H)  Referral to endocrinology, check prolactin level.  - Adult Endocrinology  Referral; Future  - PROLACTIN ([facility])    2. Pituitary tumor  Also repeat MRI pituitary.  - Adult Endocrinology  Referral; Future  - Radiology Referral; Future  - PROLACTIN ([facility])  - VENOUS COLLECTION  - MR Pituitary w/o & w Contrast; Future    3. Visit for screening mammogram    - Mammo Screening digital (bilat); Future  - Radiology Referral; Future    4. Lipid screening    - Lipid Panel ([facility])    5. Screening for diabetes mellitus    - Basic Metabolic Panel ([facility])    6. Need for hepatitis C screening test    - HEPATITIS C ANTIBODY ([facility])    7. Screening for HIV (human immunodeficiency virus)    - HIV 1/2 Agn Jemima 4th Gen w Reflex ([facility])    8. Periodic headache syndrome, not intractable  Refilled imitrex.  - SUMAtriptan (IMITREX) 25 MG tablet; Take 1 tablet (25 mg) by mouth at onset of headache for migraine May repeat in 2 hours.  "Max 8 tablets/24 hours.  Dispense: 8 tablet; Refill: 0         BMI:   Estimated body mass index is 43.56 kg/m  as calculated from the following:    Height as of this encounter: 1.613 m (5' 3.5\").    Weight as of this encounter: 113.3 kg (249 lb 12.8 oz).         FUTURE APPOINTMENTS:       - Follow-up visit per resutls.    No follow-ups on file.    Dorene Aviles MD  [facility]    Subjective     Nursing Notes:   Christen Naranjo, [facility]  5/13/2022  9:21 AM  Addendum  Chief Complaint   Patient presents with     Referral     Referral for endocrinology, has pituitary tumor found a few years ago, now having vision change, joint pain and swelling      Pre-visit Screening:  Immunizations:  up to date  Colonoscopy:   Pt declines today  Mammogram: pt declines today  Asthma Action Test/Plan:  NA  PHQ9:  NA  GAD7:  NA  Questioned patient about current smoking habits Pt. quit smoking some time ago.  Ok to leave detailed message on voice mail for today's visit only Yes, phone # [phone]           Katarzyna Brito is a 46 year old female who presents to clinic today for the following health issues   HPI     History pituitary tumor, was seeing a doctor for this and was on a medication.  Is having sx again--spoke to a friend who is  Doctor who recommended that she come in to be seen.  Has been off her medications for at least a year.  Stopped taking it but it increases her sex drive to an uncomfortable level.  Symptoms now include knees swelling, shoulders bother her, vision has changed \"wonky\", headaches have increased, dizziness, taking more imitrex than she normally does. Periods have stopped again.   She is also requesting a refill on her imitrex, this works for her migrane headaches.        Review of Systems   Constitutional, HEENT, cardiovascular, pulmonary, gi and gu systems are negative, except as otherwise noted.      Objective    /82 (BP Location: Left arm, Patient Position: Sitting, Cuff Size: " "Adult Large)   Pulse 79   Temp 97.9  F (36.6  C) (Temporal)   Ht 1.613 m (5' 3.5\")   Wt 113.3 kg (249 lb 12.8 oz)   SpO2 100%   BMI 43.56 kg/m    Body mass index is 43.56 kg/m .  Physical Exam   GENERAL: healthy, alert and no distress  RESP: lungs clear to auscultation - no rales, rhonchi or wheezes  CV: regular rate and rhythm, normal S1 S2, no S3 or S4, no murmur, click or rub, no peripheral edema and peripheral pulses strong  MS: no gross musculoskeletal defects noted, no edema  NEURO: Normal strength and tone, mentation intact and speech normal  PSYCH: mentation appears normal, affect normal/bright    No results found for any visits on 05/13/22.      "

## 2022-05-13 ENCOUNTER — OFFICE VISIT (OUTPATIENT)
Dept: FAMILY MEDICINE | Facility: CLINIC | Age: 47
End: 2022-05-13

## 2022-05-13 VITALS
HEART RATE: 79 BPM | DIASTOLIC BLOOD PRESSURE: 82 MMHG | HEIGHT: 64 IN | SYSTOLIC BLOOD PRESSURE: 118 MMHG | WEIGHT: 249.8 LBS | OXYGEN SATURATION: 100 % | BODY MASS INDEX: 42.65 KG/M2 | TEMPERATURE: 97.9 F

## 2022-05-13 DIAGNOSIS — Z11.4 SCREENING FOR HIV (HUMAN IMMUNODEFICIENCY VIRUS): ICD-10-CM

## 2022-05-13 DIAGNOSIS — E22.1 HYPERPROLACTINEMIA (H): Primary | ICD-10-CM

## 2022-05-13 DIAGNOSIS — Z11.59 NEED FOR HEPATITIS C SCREENING TEST: ICD-10-CM

## 2022-05-13 DIAGNOSIS — G43.C0 PERIODIC HEADACHE SYNDROME, NOT INTRACTABLE: ICD-10-CM

## 2022-05-13 DIAGNOSIS — Z12.31 VISIT FOR SCREENING MAMMOGRAM: ICD-10-CM

## 2022-05-13 DIAGNOSIS — D49.7 PITUITARY TUMOR: ICD-10-CM

## 2022-05-13 DIAGNOSIS — Z13.220 LIPID SCREENING: ICD-10-CM

## 2022-05-13 DIAGNOSIS — Z13.1 SCREENING FOR DIABETES MELLITUS: ICD-10-CM

## 2022-05-13 LAB
BUN SERPL-MCNC: 11 MG/DL (ref 7–25)
BUN/CREATININE RATIO: 12.2 (ref 6–22)
CALCIUM SERPL-MCNC: 8.4 MG/DL (ref 8.6–10.3)
CHLORIDE SERPLBLD-SCNC: 106.7 MMOL/L (ref 98–110)
CHOLEST SERPL-MCNC: 155 MG/DL (ref 0–199)
CHOLEST/HDLC SERPL: 3 {RATIO} (ref 0–5)
CREAT SERPL-MCNC: 0.9 MG/DL (ref 0.6–1.3)
GLUCOSE SERPL-MCNC: 89 MG/DL (ref 60–99)
HDLC SERPL-MCNC: 56 MG/DL (ref 40–150)
LDLC SERPL CALC-MCNC: 88 MG/DL (ref 0–130)
POTASSIUM SERPL-SCNC: 4.26 MMOL/L (ref 3.5–5.3)
SODIUM SERPL-SCNC: 143 MMOL/L (ref 135–146)
TRIGL SERPL-MCNC: 55 MG/DL (ref 0–149)

## 2022-05-13 PROCEDURE — 99214 OFFICE O/P EST MOD 30 MIN: CPT | Performed by: FAMILY MEDICINE

## 2022-05-13 PROCEDURE — 80061 LIPID PANEL: CPT | Performed by: FAMILY MEDICINE

## 2022-05-13 PROCEDURE — 36415 COLL VENOUS BLD VENIPUNCTURE: CPT | Performed by: FAMILY MEDICINE

## 2022-05-13 PROCEDURE — 80048 BASIC METABOLIC PNL TOTAL CA: CPT | Performed by: FAMILY MEDICINE

## 2022-05-13 RX ORDER — SUMATRIPTAN 25 MG/1
25 TABLET, FILM COATED ORAL
Qty: 8 TABLET | Refills: 0 | Status: SHIPPED | OUTPATIENT
Start: 2022-05-13

## 2022-05-13 NOTE — NURSING NOTE
Chief Complaint   Patient presents with     Referral     Referral for endocrinology, has pituitary tumor found a few years ago, now having vision change, joint pain and swelling      Pre-visit Screening:  Immunizations:  up to date  Colonoscopy:   Pt declines today  Mammogram: pt declines today  Asthma Action Test/Plan:  NA  PHQ9:  NA  GAD7:  NA  Questioned patient about current smoking habits Pt. quit smoking some time ago.  Ok to leave detailed message on voice mail for today's visit only Yes, phone # [phone]

## 2022-05-14 LAB
HCV AB - QUEST: NORMAL
HIV 1/2 AGN ABY 4TH GEN WITH REFLEX: NORMAL
PROLACTIN - QUEST: 68.7 NG/ML
SIGNAL TO CUT OFF - QUEST: 0.04

## 2022-06-07 ENCOUNTER — OFFICE VISIT (OUTPATIENT)
Dept: FAMILY MEDICINE | Facility: CLINIC | Age: 47
End: 2022-06-07

## 2022-06-07 VITALS
BODY MASS INDEX: 43.02 KG/M2 | OXYGEN SATURATION: 99 % | WEIGHT: 252 LBS | SYSTOLIC BLOOD PRESSURE: 142 MMHG | DIASTOLIC BLOOD PRESSURE: 98 MMHG | HEIGHT: 64 IN | TEMPERATURE: 97.4 F | HEART RATE: 75 BPM

## 2022-06-07 DIAGNOSIS — R07.9 LEFT-SIDED CHEST PAIN: Primary | ICD-10-CM

## 2022-06-07 LAB
ERYTHROCYTE [DISTWIDTH] IN BLOOD BY AUTOMATED COUNT: 12.8 %
HCT VFR BLD AUTO: 36.8 % (ref 35–47)
HEMOGLOBIN: 12 G/DL (ref 11.7–15.7)
MCH RBC QN AUTO: 27.3 PG (ref 26–33)
MCHC RBC AUTO-ENTMCNC: 32.6 G/DL (ref 31–36)
MCV RBC AUTO: 83.9 FL (ref 78–100)
PLATELET COUNT - QUEST: 292 10^9/L (ref 150–375)
RBC # BLD AUTO: 4.39 10*12/L (ref 3.8–5.2)
WBC # BLD AUTO: 8 10*9/L (ref 4–11)

## 2022-06-07 PROCEDURE — 93000 ELECTROCARDIOGRAM COMPLETE: CPT | Performed by: PHYSICIAN ASSISTANT

## 2022-06-07 PROCEDURE — 71046 X-RAY EXAM CHEST 2 VIEWS: CPT | Performed by: PHYSICIAN ASSISTANT

## 2022-06-07 PROCEDURE — 85027 COMPLETE CBC AUTOMATED: CPT | Performed by: PHYSICIAN ASSISTANT

## 2022-06-07 PROCEDURE — 99214 OFFICE O/P EST MOD 30 MIN: CPT | Mod: 25 | Performed by: PHYSICIAN ASSISTANT

## 2022-06-07 PROCEDURE — 36415 COLL VENOUS BLD VENIPUNCTURE: CPT | Performed by: PHYSICIAN ASSISTANT

## 2022-06-07 RX ORDER — CABERGOLINE 0.5 MG/1
0.25 TABLET ORAL
COMMUNITY

## 2022-06-07 NOTE — NURSING NOTE
Chief Complaint   Patient presents with     Hypertension     Pt had a higher bp at her GI doctor-- 142/100 mei         Pre-visit Screening:  Immunizations:  up to date  Colonoscopy:  is up to date  Mammogram: is up to date  Asthma Action Test/Plan:  NA  PHQ9:  NA  GAD7:  NA  Questioned patient about current smoking habits Pt. Quit some time ago  Ok to leave detailed message on voice mail for today's visit only Yes, phone # [phone]

## 2022-06-07 NOTE — PROGRESS NOTES
"CC: Chest pain    History:  Pt was in ER 5/26/2022 after aspirating on a bite of her salad. She had chest x-ray at ER and was negative. They discharged her on prednisone and albuterol inhaler, but she misunderstood and only took the prednisone. Saved albuterol for if her asthma flared up, did not think it was to help with aspiration.     Started to get left chest pain while in ER, and since that time has been there constantly.  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     Was at specialist last week, who felt that her chest pain was likely from HTN, but she has had well controlled BP until ER visit and now today.     PMH, MEDICATIONS, ALLERGIES, SOCIAL AND FAMILY HISTORY in [facility] and reviewed by me personally.    ROS negative other than the symptoms noted above in the HPI.      Examination   BP (!) 142/98 (BP Location: Right arm, Patient Position: Sitting, Cuff Size: Adult Large)   Pulse 75   Temp 97.4  F (36.3  C) (Temporal)   Ht 1.613 m (5' 3.5\")   Wt 114.3 kg (252 lb)   SpO2 99%   BMI 43.94 kg/m      Constitutional: Sitting comfortably, in no acute distress. Vital signs noted  Mouth and throat: without erythema or lesions of the mucosa  Neck:  no adenopathy, trachea midline and normal to palpation, thyroid normal to palpation  Cardiovascular:  regular rate and rhythm, no murmurs, clicks, or gallops  Respiratory:  normal respiratory rate and rhythm, lungs clear to auscultation  SKIN: No jaundice/pallor/rash.   Psychiatric: mentation appears normal and affect normal/bright        A/P    ICD-10-CM    1. Left-sided chest pain  R07.9 EKG 12-lead complete w/read - Clinics     XR Chest 2 Views     VENOUS COLLECTION     Hemogram Platelet ([facility])       DISCUSSION:  Repeated CXR today, which is confirmed negative by radiology report. Also completed EKG that did not reveal any acute abnormalities, with no change compared to 2021. Will check CBC to ensure no significant leukocytosis. Will contact " pt tomorrow to inform of results. Would like her to try albuterol today to see if there is any symptomatic relief. Can also try applying heat to chest, gentle stretching, as I suspect this is musculoskeletal chest pain. Reassured by normal vitals.     Did agree to monitor BP, check BP 2-3 times weekly at home, but suspect this will improve back to her normal controlled BP, similar to where it was prior to ER visit.     follow up visit: As needed    Time of visit: 35 minutes    Sonia Cee PA-C  [facility]

## 2022-06-08 ENCOUNTER — TELEPHONE (OUTPATIENT)
Dept: FAMILY MEDICINE | Facility: CLINIC | Age: 47
End: 2022-06-08

## 2022-06-08 NOTE — TELEPHONE ENCOUNTER
Called and left detailed message. Informed of normal lab, EKG, CXR. Asked if she has checked BP at home, tried albuterol, tried any stretching/heating. Asked her to contact me via MyChart or phone with update.

## 2022-06-19 ENCOUNTER — HEALTH MAINTENANCE LETTER (OUTPATIENT)
Age: 47
End: 2022-06-19

## 2022-11-19 ENCOUNTER — HEALTH MAINTENANCE LETTER (OUTPATIENT)
Age: 47
End: 2022-11-19

## 2022-12-29 NOTE — ED NOTES
A/O. VSS.. Pt verbalized understanding of d/c instructions and ambulated to lobby steady gait.   MD note given.  Script rx *tamiflu given to pt at time of d/c     Rx Refill Note  Requested Prescriptions     Pending Prescriptions Disp Refills   • potassium chloride (MICRO-K) 10 MEQ CR capsule [Pharmacy Med Name: POTASSIUM CL ER 10 MEQ CAPSULE] 60 capsule 1     Sig: TAKE 2 CAPSULES BY MOUTH EVERY DAY      Last office visit with prescribing clinician: 11/21/2022   Last telemedicine visit with prescribing clinician: 1/4/2023   Next office visit with prescribing clinician: 1/4/2023                         Would you like a call back once the refill request has been completed: [] Yes [] No    If the office needs to give you a call back, can they leave a voicemail: [] Yes [] No    Joseline Munson, [employer] Rep  12/29/22, 08:09 CST

## 2023-07-02 ENCOUNTER — HEALTH MAINTENANCE LETTER (OUTPATIENT)
Age: 48
End: 2023-07-02

## 2023-12-06 ENCOUNTER — HOSPITAL ENCOUNTER (EMERGENCY)
Facility: CLINIC | Age: 48
Discharge: HOME | End: 2023-12-06
Attending: PHYSICIAN ASSISTANT | Admitting: PHYSICIAN ASSISTANT
Payer: COMMERCIAL

## 2023-12-06 ENCOUNTER — APPOINTMENT (OUTPATIENT)
Dept: GENERAL RADIOLOGY | Facility: CLINIC | Age: 48
End: 2023-12-06
Attending: EMERGENCY MEDICINE
Payer: COMMERCIAL

## 2023-12-06 VITALS
SYSTOLIC BLOOD PRESSURE: 141 MMHG | TEMPERATURE: 97.3 F | DIASTOLIC BLOOD PRESSURE: 74 MMHG | OXYGEN SATURATION: 99 % | HEART RATE: 75 BPM | RESPIRATION RATE: 22 BRPM

## 2023-12-06 DIAGNOSIS — J20.9 ACUTE BRONCHITIS, UNSPECIFIED ORGANISM: ICD-10-CM

## 2023-12-06 DIAGNOSIS — R07.9 CHEST PAIN, UNSPECIFIED TYPE: ICD-10-CM

## 2023-12-06 LAB
ANION GAP SERPL CALCULATED.3IONS-SCNC: 9 MMOL/L (ref 7–15)
ATRIAL RATE - MUSE: 69 BPM
BASOPHILS # BLD AUTO: 0 10E3/UL (ref 0–0.2)
BASOPHILS NFR BLD AUTO: 0 %
BUN SERPL-MCNC: 8.6 MG/DL (ref 6–20)
CALCIUM SERPL-MCNC: 8.1 MG/DL (ref 8.6–10)
CHLORIDE SERPL-SCNC: 105 MMOL/L (ref 98–107)
CREAT SERPL-MCNC: 0.77 MG/DL (ref 0.51–0.95)
DEPRECATED HCO3 PLAS-SCNC: 27 MMOL/L (ref 22–29)
DIASTOLIC BLOOD PRESSURE - MUSE: NORMAL MMHG
EGFRCR SERPLBLD CKD-EPI 2021: >90 ML/MIN/1.73M2
EOSINOPHIL # BLD AUTO: 0.1 10E3/UL (ref 0–0.7)
EOSINOPHIL NFR BLD AUTO: 1 %
ERYTHROCYTE [DISTWIDTH] IN BLOOD BY AUTOMATED COUNT: 13.5 % (ref 10–15)
FLUAV RNA SPEC QL NAA+PROBE: NEGATIVE
FLUBV RNA RESP QL NAA+PROBE: NEGATIVE
GLUCOSE SERPL-MCNC: 96 MG/DL (ref 70–99)
HCT VFR BLD AUTO: 38.1 % (ref 35–47)
HGB BLD-MCNC: 11.9 G/DL (ref 11.7–15.7)
HOLD SPECIMEN: NORMAL
HOLD SPECIMEN: NORMAL
IMM GRANULOCYTES # BLD: 0 10E3/UL
IMM GRANULOCYTES NFR BLD: 0 %
INTERPRETATION ECG - MUSE: NORMAL
LYMPHOCYTES # BLD AUTO: 3.3 10E3/UL (ref 0.8–5.3)
LYMPHOCYTES NFR BLD AUTO: 44 %
MCH RBC QN AUTO: 26.3 PG (ref 26.5–33)
MCHC RBC AUTO-ENTMCNC: 31.2 G/DL (ref 31.5–36.5)
MCV RBC AUTO: 84 FL (ref 78–100)
MONOCYTES # BLD AUTO: 0.5 10E3/UL (ref 0–1.3)
MONOCYTES NFR BLD AUTO: 7 %
NEUTROPHILS # BLD AUTO: 3.6 10E3/UL (ref 1.6–8.3)
NEUTROPHILS NFR BLD AUTO: 48 %
NRBC # BLD AUTO: 0 10E3/UL
NRBC BLD AUTO-RTO: 0 /100
P AXIS - MUSE: 13 DEGREES
PLATELET # BLD AUTO: 318 10E3/UL (ref 150–450)
POTASSIUM SERPL-SCNC: 3.6 MMOL/L (ref 3.4–5.3)
PR INTERVAL - MUSE: 132 MS
QRS DURATION - MUSE: 90 MS
QT - MUSE: 420 MS
QTC - MUSE: 450 MS
R AXIS - MUSE: 19 DEGREES
RBC # BLD AUTO: 4.52 10E6/UL (ref 3.8–5.2)
RSV RNA SPEC NAA+PROBE: NEGATIVE
SARS-COV-2 RNA RESP QL NAA+PROBE: NEGATIVE
SODIUM SERPL-SCNC: 141 MMOL/L (ref 135–145)
SYSTOLIC BLOOD PRESSURE - MUSE: NORMAL MMHG
T AXIS - MUSE: 2 DEGREES
TROPONIN T SERPL HS-MCNC: <6 NG/L
VENTRICULAR RATE- MUSE: 69 BPM
WBC # BLD AUTO: 7.5 10E3/UL (ref 4–11)

## 2023-12-06 PROCEDURE — 80048 BASIC METABOLIC PNL TOTAL CA: CPT | Performed by: PHYSICIAN ASSISTANT

## 2023-12-06 PROCEDURE — 71046 X-RAY EXAM CHEST 2 VIEWS: CPT

## 2023-12-06 PROCEDURE — 85025 COMPLETE CBC W/AUTO DIFF WBC: CPT | Performed by: PHYSICIAN ASSISTANT

## 2023-12-06 PROCEDURE — 99285 EMERGENCY DEPT VISIT HI MDM: CPT | Mod: 25

## 2023-12-06 PROCEDURE — 87637 SARSCOV2&INF A&B&RSV AMP PRB: CPT | Performed by: PHYSICIAN ASSISTANT

## 2023-12-06 PROCEDURE — 84484 ASSAY OF TROPONIN QUANT: CPT | Performed by: PHYSICIAN ASSISTANT

## 2023-12-06 PROCEDURE — 36415 COLL VENOUS BLD VENIPUNCTURE: CPT | Performed by: EMERGENCY MEDICINE

## 2023-12-06 PROCEDURE — 93005 ELECTROCARDIOGRAM TRACING: CPT

## 2023-12-06 RX ORDER — BENZONATATE 100 MG/1
100 CAPSULE ORAL 3 TIMES DAILY PRN
Qty: 21 CAPSULE | Refills: 0 | Status: SHIPPED | OUTPATIENT
Start: 2023-12-06 | End: 2023-12-13

## 2023-12-06 RX ORDER — ALBUTEROL SULFATE 0.83 MG/ML
2.5 SOLUTION RESPIRATORY (INHALATION) EVERY 6 HOURS PRN
Qty: 75 ML | Refills: 0 | Status: SHIPPED | OUTPATIENT
Start: 2023-12-06

## 2023-12-06 RX ORDER — PREDNISONE 20 MG/1
TABLET ORAL
Qty: 10 TABLET | Refills: 0 | Status: SHIPPED | OUTPATIENT
Start: 2023-12-06

## 2023-12-06 NOTE — ED TRIAGE NOTES
Pt c/o chest pain since last night. Pt states that she has a productive cough. Pt reports that she has been sick for about a week and a half. Also sob. Pt alert and ambulatory.

## 2023-12-06 NOTE — ED PROVIDER NOTES
History     Chief Complaint:  Chest pain    The history is provided by the patient.      Katarzyna Brito is a 48 year old female with history of asthma presenting for evaluation of chest pain. Katarzyna reports non exertional sternal chest tightness onset last night. She also reports a persistent cough for the past ten days. She notes a fever last week. She reports wheezing and use of albuterol and nebulizer. She denies hemoptysis. She denies a history of blood clots, heart disease, hypertension, hyperlipidemia, or diabetes. She denies recent surgeries or hospitalizations. She denies use of birth control. She denies a family history of MI.    Independent Historian:   None - Patient Only    Review of External Notes:        Medications:    Albuterol  Dostinex  Imitrex  Topamax    Past Medical History:    Asthma  Migraine  Pneumonia  Gastroenteritis  Polyarthralgia  Amenorrhea  Galactorrhea    Past Surgical History:     section x2  Rotator cuff repair RT/LT, right  Tonsillectomy  Tubal ligation    Physical Exam   Patient Vitals for the past 24 hrs:   BP Temp Temp src Pulse Resp SpO2   23 0946 (!) 141/74 97.3  F (36.3  C) Oral 75 22 99 %   23 0819 (!) 161/96 97.3  F (36.3  C) Temporal 76 16 98 %     Physical Exam  Vitals and nursing note reviewed.   Constitutional:       Appearance: She is not diaphoretic.   HENT:      Mouth/Throat:      Mouth: Mucous membranes are moist.      Pharynx: Oropharynx is clear. No oropharyngeal exudate or posterior oropharyngeal erythema.   Eyes:      General: No scleral icterus.        Right eye: No discharge.         Left eye: No discharge.      Conjunctiva/sclera: Conjunctivae normal.   Cardiovascular:      Rate and Rhythm: Normal rate and regular rhythm.      Pulses:           Radial pulses are 2+ on the right side and 2+ on the left side.      Heart sounds: Normal heart sounds. No murmur heard.     No friction rub. No gallop.   Pulmonary:      Effort: Pulmonary effort  is normal. No respiratory distress.      Breath sounds: Normal breath sounds. No stridor. No wheezing, rhonchi or rales.   Chest:      Chest wall: No tenderness.   Abdominal:      General: There is no distension.      Palpations: Abdomen is soft.      Tenderness: There is no abdominal tenderness. There is no guarding.   Musculoskeletal:         General: No swelling or tenderness.      Right lower leg: No edema.      Left lower leg: No edema.   Skin:     General: Skin is warm.      Coloration: Skin is not jaundiced or pale.   Neurological:      Mental Status: She is alert and oriented to person, place, and time. Mental status is at baseline.   Psychiatric:         Mood and Affect: Mood normal.         Behavior: Behavior normal.         Thought Content: Thought content normal.         Emergency Department Course   ECG  ECG taken at 0829, ECG read at 0839  Sinus rhythm  Normal ECG   Rate 69 bpm. TN interval 132 ms. QRS duration 90 ms. QT/QTc 420/450 ms. P-R-T axes 13 19 2.     Imaging:  Chest XR,  PA & LAT   Final Result   IMPRESSION: No infiltrate, pleural effusion or pneumothorax. The   cardiac and mediastinal silhouettes are normal.      OSEI ABAD MD            SYSTEM ID:  [id number]         Laboratory:  Labs Ordered and Resulted from Time of ED Arrival to Time of ED Departure   BASIC METABOLIC PANEL - Abnormal       Result Value    Sodium 141      Potassium 3.6      Chloride 105      Carbon Dioxide (CO2) 27      Anion Gap 9      Urea Nitrogen 8.6      Creatinine 0.77      GFR Estimate >90      Calcium 8.1 (*)     Glucose 96     CBC WITH PLATELETS AND DIFFERENTIAL - Abnormal    WBC Count 7.5      RBC Count 4.52      Hemoglobin 11.9      Hematocrit 38.1      MCV 84      MCH 26.3 (*)     MCHC 31.2 (*)     RDW 13.5      Platelet Count 318      % Neutrophils 48      % Lymphocytes 44      % Monocytes 7      % Eosinophils 1      % Basophils 0      % Immature Granulocytes 0      NRBCs per 100 WBC 0      Absolute  Neutrophils 3.6      Absolute Lymphocytes 3.3      Absolute Monocytes 0.5      Absolute Eosinophils 0.1      Absolute Basophils 0.0      Absolute Immature Granulocytes 0.0      Absolute NRBCs 0.0     TROPONIN T, HIGH SENSITIVITY - Normal    Troponin T, High Sensitivity <6     INFLUENZA A/B, RSV, & SARS-COV2 PCR - Normal    Influenza A PCR Negative      Influenza B PCR Negative      RSV PCR Negative      SARS CoV2 PCR Negative       Emergency Department Course & Assessments:       Interventions:  Medications - No data to display     Assessments:  0945 I obtained history and examined the patient as noted above. I discussed findings and discharge with the patient. All questions answered.     Independent Interpretation (X-rays, CTs, rhythm strip):  I reviewed the patient's chest X-ray and note no pneumothorax, infiltrates, or effusions.    Consultations/Discussion of Management or Tests:  None   ED Course as of 12/06/23 1957   Wed Dec 06, 2023   0955 HEART Score for Major Cardiac Events from [employer]  on 12/6/2023  ** All calculations should be rechecked by clinician prior to use **    RESULT SUMMARY:  3 points  Low Score (0-3 points)    Risk of MACE of 0.9-1.7%.      INPUTS:  History -> 1 = Moderately suspicious  EKG -> 0 = Normal  Age -> 1 = 45-64  Risk factors -> 1 = 1-2 risk factors  Initial troponin -> 0 = =normal limit     0955 PERC Rule for Pulmonary Embolism from [employer]  on 12/6/2023  ** All calculations should be rechecked by clinician prior to use **    RESULT SUMMARY:  0 criteria  No need for further workup, as <2% chance of PE.    If no criteria are positive and clinician's pre-test probability is <15%, PERC Rule criteria are satisfied.      INPUTS:  Age =50 -> 0 = No  HR =100 -> 0 = No  O2 sat on room air  -> 0 = No  Unilateral leg swelling -> 0 = No  Hemoptysis -> 0 = No  Recent surgery or trauma -> 0 = No  Prior PE or DVT -> 0 = No  Hormone use -> 0 = No         Social Determinants of Health  affecting care:   None    Disposition:  The patient was discharged to home.     Impression & Plan    Medical Decision Making:  This is a 48-year-old female who presents with concerns regarding chest pain in the setting of recent cough.  Broad differential was considered including but not limited to ACS, PE, aortic dissection, pneumonia, pneumothorax, bronchitis, pericarditis, carditis among many others.  At this time the patient fulfills PERC criteria in the setting of low Wells criteria I think unlikely PE.  EKG obtained without ischemic changes and negative troponin.  Based on her ED protocols there is sufficient to rule out ACS in the setting.  Given her symptoms have been persistent beyond 24 hours.  I suspect she likely has bronchitis and pleurisy secondary to her cough and upper respiratory infection.  COVID influenza and RSV testing were negative.  Patient's heart score is is 3 and considered low risk.  Chest x-ray negative for pneumonia pneumothorax or widened mediastinum or effusions.  I recommend albuterol inhaler and Tessalon Perles.  Will also provide a burst of prednisone.  Recommend close primary care follow-up in the next 3 days.  Return back to the emergency department if her symptoms worsen or she develops new concerning signs or symptoms.    Diagnosis:    ICD-10-CM    1. Chest pain, unspecified type  R07.9       2. Acute bronchitis, unspecified organism  J20.9         Discharge Medications:  Discharge Medication List as of 12/6/2023  9:57 AM        START taking these medications    Details   albuterol (PROVENTIL) (2.5 MG/3ML) 0.083% neb solution Take 1 vial (2.5 mg) by nebulization every 6 hours as needed for shortness of breath or wheezing, Disp-75 mL, R-0, Local Print      benzonatate (TESSALON) 100 MG capsule Take 1 capsule (100 mg) by mouth 3 times daily as needed for cough, Disp-21 capsule, R-0, Local Print      predniSONE (DELTASONE) 20 MG tablet Take two tablets (= 40mg) each day for 5  (five) days, Disp-10 tablet, R-0, Local Print            Scribe Disclosure:  I, Carlos Cook, am serving as a scribe at 9:45 AM on 12/6/2023 to document services personally performed by Jigar Beverly PA-C based on my observations and the provider's statements to me.   12/6/2023   Jigar Beverly PA-C Kruger, Jacob C, PA-C  12/06/23 2000

## 2024-06-17 PROBLEM — Z76.89 HEALTH CARE HOME: Status: RESOLVED | Noted: 2018-12-19 | Resolved: 2024-06-17

## 2024-08-24 ENCOUNTER — HEALTH MAINTENANCE LETTER (OUTPATIENT)
Age: 49
End: 2024-08-24

## 2025-07-12 ENCOUNTER — HEALTH MAINTENANCE LETTER (OUTPATIENT)
Age: 50
End: 2025-07-12